# Patient Record
Sex: MALE | Race: WHITE | NOT HISPANIC OR LATINO | URBAN - METROPOLITAN AREA
[De-identification: names, ages, dates, MRNs, and addresses within clinical notes are randomized per-mention and may not be internally consistent; named-entity substitution may affect disease eponyms.]

---

## 2023-10-06 ENCOUNTER — LAB (OUTPATIENT)
Dept: LAB | Facility: LAB | Age: 78
End: 2023-10-06
Payer: MEDICARE

## 2023-10-06 ENCOUNTER — NURSING HOME VISIT (OUTPATIENT)
Dept: POST ACUTE CARE | Facility: EXTERNAL LOCATION | Age: 78
End: 2023-10-06

## 2023-10-06 DIAGNOSIS — E53.8 VITAMIN B12 DEFICIENCY: ICD-10-CM

## 2023-10-06 DIAGNOSIS — E11.9 TYPE 2 DIABETES MELLITUS WITHOUT COMPLICATION, WITHOUT LONG-TERM CURRENT USE OF INSULIN (MULTI): ICD-10-CM

## 2023-10-06 DIAGNOSIS — K21.9 GERD WITHOUT ESOPHAGITIS: ICD-10-CM

## 2023-10-06 DIAGNOSIS — I10 HYPERTENSION, BENIGN: ICD-10-CM

## 2023-10-06 DIAGNOSIS — E55.9 VITAMIN D DEFICIENCY: ICD-10-CM

## 2023-10-06 DIAGNOSIS — F41.1 GENERALIZED ANXIETY DISORDER: ICD-10-CM

## 2023-10-06 DIAGNOSIS — F33.1 MAJOR DEPRESSIVE DISORDER, RECURRENT, MODERATE (MULTI): ICD-10-CM

## 2023-10-06 DIAGNOSIS — U07.1 COVID-19: Primary | ICD-10-CM

## 2023-10-06 PROBLEM — K59.01 SLOW TRANSIT CONSTIPATION: Status: ACTIVE | Noted: 2023-10-06

## 2023-10-06 PROBLEM — K29.70 GASTRITIS DETERMINED BY ENDOSCOPY: Status: ACTIVE | Noted: 2023-10-06

## 2023-10-06 PROBLEM — G47.00 INSOMNIA: Status: ACTIVE | Noted: 2023-10-06

## 2023-10-06 PROBLEM — I63.9 CEREBROVASCULAR ACCIDENT (CVA) (MULTI): Status: ACTIVE | Noted: 2023-10-06

## 2023-10-06 PROBLEM — R73.09 BLOOD GLUCOSE ABNORMAL: Status: ACTIVE | Noted: 2023-10-06

## 2023-10-06 PROBLEM — R19.5 GUAIAC POSITIVE STOOLS: Status: ACTIVE | Noted: 2023-10-06

## 2023-10-06 PROBLEM — M10.9 GOUT: Status: ACTIVE | Noted: 2023-10-06

## 2023-10-06 PROBLEM — M19.90 ARTHRITIS: Status: ACTIVE | Noted: 2023-10-06

## 2023-10-06 PROBLEM — R26.9 ABNORMAL GAIT: Status: ACTIVE | Noted: 2023-10-06

## 2023-10-06 PROBLEM — N40.0 BENIGN PROSTATIC HYPERPLASIA WITHOUT URINARY OBSTRUCTION: Status: ACTIVE | Noted: 2023-10-06

## 2023-10-06 PROBLEM — I20.9 ANGINA PECTORIS (CMS-HCC): Status: ACTIVE | Noted: 2023-10-06

## 2023-10-06 PROBLEM — K80.20 CHOLELITHIASIS WITHOUT OBSTRUCTION: Status: ACTIVE | Noted: 2023-10-06

## 2023-10-06 PROBLEM — N39.0 ACUTE LOWER URINARY TRACT INFECTION: Status: ACTIVE | Noted: 2023-10-06

## 2023-10-06 PROBLEM — G56.00 CARPAL TUNNEL SYNDROME: Status: ACTIVE | Noted: 2023-10-06

## 2023-10-06 PROBLEM — G90.3 ORTHOSTATIC HYPOTENSION DUE TO PARKINSON'S DISEASE (MULTI): Status: ACTIVE | Noted: 2023-10-06

## 2023-10-06 PROBLEM — M19.019 INFLAMMATION OF JOINT OF SHOULDER REGION: Status: ACTIVE | Noted: 2023-10-06

## 2023-10-06 PROBLEM — S32.000A COMPRESSION FRACTURE OF LUMBAR VERTEBRA (MULTI): Status: ACTIVE | Noted: 2023-10-06

## 2023-10-06 PROBLEM — Z86.73 HISTORY OF CEREBROVASCULAR ACCIDENT: Status: ACTIVE | Noted: 2023-10-06

## 2023-10-06 PROBLEM — R10.13 DYSPEPSIA: Status: ACTIVE | Noted: 2023-10-06

## 2023-10-06 PROBLEM — I49.9 CARDIAC ARRHYTHMIA: Status: ACTIVE | Noted: 2023-10-06

## 2023-10-06 LAB
ALBUMIN SERPL BCP-MCNC: 3.7 G/DL (ref 3.4–5)
ALP SERPL-CCNC: 85 U/L (ref 33–136)
ALT SERPL W P-5'-P-CCNC: 8 U/L (ref 10–52)
ANION GAP SERPL CALC-SCNC: 17 MMOL/L (ref 10–20)
AST SERPL W P-5'-P-CCNC: 8 U/L (ref 9–39)
BILIRUB SERPL-MCNC: 0.3 MG/DL (ref 0–1.2)
BUN SERPL-MCNC: 21 MG/DL (ref 6–23)
CALCIUM SERPL-MCNC: 9.4 MG/DL (ref 8.6–10.3)
CHLORIDE SERPL-SCNC: 105 MMOL/L (ref 98–107)
CO2 SERPL-SCNC: 22 MMOL/L (ref 21–32)
CREAT SERPL-MCNC: 1 MG/DL (ref 0.5–1.3)
ERYTHROCYTE [DISTWIDTH] IN BLOOD BY AUTOMATED COUNT: 13.6 % (ref 11.5–14.5)
GFR SERPL CREATININE-BSD FRML MDRD: 77 ML/MIN/1.73M*2
GLUCOSE SERPL-MCNC: 124 MG/DL (ref 74–99)
HCT VFR BLD AUTO: 35.7 % (ref 41–52)
HGB BLD-MCNC: 11.5 G/DL (ref 13.5–17.5)
MCH RBC QN AUTO: 28.3 PG (ref 26–34)
MCHC RBC AUTO-ENTMCNC: 32.2 G/DL (ref 32–36)
MCV RBC AUTO: 88 FL (ref 80–100)
NRBC BLD-RTO: 0 /100 WBCS (ref 0–0)
PLATELET # BLD AUTO: 245 X10*3/UL (ref 150–450)
PMV BLD AUTO: 10.4 FL (ref 7.5–11.5)
POTASSIUM SERPL-SCNC: 5.2 MMOL/L (ref 3.5–5.3)
PROT SERPL-MCNC: 6 G/DL (ref 6.4–8.2)
RBC # BLD AUTO: 4.06 X10*6/UL (ref 4.5–5.9)
SODIUM SERPL-SCNC: 139 MMOL/L (ref 136–145)
WBC # BLD AUTO: 6.8 X10*3/UL (ref 4.4–11.3)

## 2023-10-06 PROCEDURE — 99349 HOME/RES VST EST MOD MDM 40: CPT | Performed by: NURSE PRACTITIONER

## 2023-10-06 PROCEDURE — 36415 COLL VENOUS BLD VENIPUNCTURE: CPT | Performed by: NURSE PRACTITIONER

## 2023-10-06 PROCEDURE — 82306 VITAMIN D 25 HYDROXY: CPT

## 2023-10-06 PROCEDURE — 82607 VITAMIN B-12: CPT

## 2023-10-06 PROCEDURE — 36415 COLL VENOUS BLD VENIPUNCTURE: CPT

## 2023-10-06 PROCEDURE — 83036 HEMOGLOBIN GLYCOSYLATED A1C: CPT

## 2023-10-06 ASSESSMENT — PAIN SCALES - GENERAL: PAINLEVEL: 0-NO PAIN

## 2023-10-06 NOTE — PROGRESS NOTES
"Subjective   Patient ID: Phuc Varela is a 77 y.o. male who presents for Follow-up (Covid, increased s/sx of depression).    Visit for 76 y/o male seen today at [facility], accompanied by facility staff for follow up of COVID, increased s/sx of depression and routine labs. Patient is lying on his bed this afternoon. He easily awakes upon verbal command and is able to answer all questions regarding his health. He was recently diagnosed with COVID. He reports that his cough and nasal drainage have improved but he is still eating poorly. His appetite varies and this started before his covid diagnosis. Facility staff reports that patient is attention seeking. He is not wanting to eat and will check his glucose level reporting hypoglycemia. He had a recent glucose level of 50 and when staff inquired if patient ate he stated \"no\". He tells me that he did not eat lunch today. He states \"I'm anxious, anxious about life in general\". He did see his mental health NP this week and she is aware of his current s/sx. He denies SI or HI. He is taking Mirtazepine and is compliant with his medications. He denies fever, chills, chest pain, shortness of breath, wheezing, abdominal pain, nausea, vomiting. Admits to intermittent diarrhea and states \"its because I'm anxious\". He denies bladder concerns. Denies difficulty sleeping. Due for routine labs today.         Current Outpatient Medications:     amLODIPine (Norvasc) 5 mg tablet, Take 1 tablet (5 mg) by mouth once daily., Disp: , Rfl:     desvenlafaxine 50 mg 24 hr tablet, Take 1 tablet (50 mg) by mouth once daily at bedtime., Disp: , Rfl:     docusate sodium (Colace) 100 mg capsule, Take 1 capsule (100 mg) by mouth once daily., Disp: , Rfl:     mirtazapine (Remeron) 7.5 mg tablet, Take 1 tablet (7.5 mg) by mouth once daily at bedtime., Disp: , Rfl:     acetaminophen (Tylenol) 325 mg tablet, Take 2 tablets (650 mg) by mouth every 6 hours if needed for mild pain (1 - 3) or " fever (temp greater than 38.0 C)., Disp: , Rfl:     allopurinol (Zyloprim) 300 mg tablet, Take 1 tablet (300 mg) by mouth once daily., Disp: , Rfl:     atorvastatin (Lipitor) 10 mg tablet, Take 1 tablet (10 mg) by mouth once daily., Disp: , Rfl:     blood sugar diagnostic strip, 1 strip by in vitro route once daily., Disp: , Rfl:     cholecalciferol (Vitamin D-3) 50 MCG (2000 UT) tablet, Take 1 tablet (2,000 Units) by mouth once daily., Disp: , Rfl:     clopidogrel (Plavix) 75 mg tablet, Take 1 tablet (75 mg) by mouth once daily., Disp: , Rfl:     cyanocobalamin (Vitamin B-12) 1,000 mcg tablet, Take 1 tablet (1,000 mcg) by mouth once daily., Disp: , Rfl:     glipiZIDE (Glucotrol) 5 mg tablet, Take 1.5 tablets (7.5 mg) by mouth 2 times a day., Disp: , Rfl:     hydrOXYzine pamoate (Vistaril) 25 mg capsule, Take 1 capsule (25 mg) by mouth 4 times a day as needed for anxiety., Disp: , Rfl:     lancets (Lancets,Ultra Thin) misc, Inject 1 Lancet under the skin once daily., Disp: , Rfl:     losartan (Cozaar) 100 mg tablet, Take 1 tablet (100 mg) by mouth once daily., Disp: , Rfl:     magnesium oxide (Mag-Ox) 400 mg (241.3 mg magnesium) tablet, Take 1 tablet (400 mg) by mouth once daily., Disp: , Rfl:     metFORMIN (Glucophage) 1,000 mg tablet, Take 1 tablet (1,000 mg) by mouth 2 times a day with meals., Disp: , Rfl:     metoprolol tartrate (Lopressor) 25 mg tablet, Take 1 tablet (25 mg) by mouth 2 times a day., Disp: , Rfl:     pantoprazole (ProtoNix) 40 mg EC tablet, Take 1 tablet (40 mg) by mouth once daily in the morning. Take before meals., Disp: , Rfl:     potassium chloride CR 20 mEq ER tablet, Take 1 tablet (20 mEq) by mouth once daily., Disp: , Rfl:     sennosides (Senokot) 8.6 mg tablet, Take 1 tablet (8.6 mg) by mouth as needed at bedtime for constipation., Disp: , Rfl:     tamsulosin (Flomax) 0.4 mg 24 hr capsule, Take 1 capsule (0.4 mg) by mouth once daily., Disp: , Rfl:      Review of Systems   Constitutional:  "        Positive for poor appetite   HENT:  Negative for congestion and rhinorrhea.    Respiratory:  Positive for chest tightness (occasional). Negative for cough, shortness of breath and wheezing.    Cardiovascular:  Negative for chest pain, palpitations and leg swelling.   Gastrointestinal:  Positive for diarrhea. Negative for abdominal pain, constipation, nausea and vomiting.   Endocrine:        Positive for diabetes   Genitourinary:  Negative for difficulty urinating.   Musculoskeletal:  Positive for arthralgias.   Neurological:  Negative for dizziness and light-headedness.   Psychiatric/Behavioral:          Positive for depression, anxiety      Objective   /62 (BP Location: Right arm, Patient Position: Lying, BP Cuff Size: Adult)   Pulse 70   Temp 36.5 °C (97.7 °F) (Temporal)   Resp 16   Ht 1.6 m (5' 3\")   Wt 52.6 kg (116 lb)   BMI 20.55 kg/m²     Physical Exam  Constitutional:       General: He is not in acute distress.     Appearance: Normal appearance.      Comments: Lying in bed   HENT:      Head: Normocephalic and atraumatic.      Nose: Nose normal.      Mouth/Throat:      Mouth: Mucous membranes are moist.      Pharynx: Oropharynx is clear.   Eyes:      Extraocular Movements: Extraocular movements intact.      Pupils: Pupils are equal, round, and reactive to light.   Cardiovascular:      Rate and Rhythm: Normal rate and regular rhythm.      Pulses: Normal pulses.   Pulmonary:      Effort: Pulmonary effort is normal. No respiratory distress.   Abdominal:      General: Bowel sounds are normal.      Palpations: Abdomen is soft.      Tenderness: There is no abdominal tenderness.   Musculoskeletal:         General: Normal range of motion.      Cervical back: Neck supple.   Skin:     General: Skin is warm and dry.   Neurological:      General: No focal deficit present.      Mental Status: He is alert and oriented to person, place, and time.   Psychiatric:      Comments: Anxious       Assessment/Plan "   Diagnoses and all orders for this visit:  COVID-19  Comments:  Resolved.  Hypertension, benign  Comments:  chronic, vitals stable  Orders:  -     CBC; Future  -     Comprehensive metabolic panel; Future  Type 2 diabetes mellitus without complication, without long-term current use of insulin (CMS/HCC)  Comments:  chronic, discussed importance of healthy diet habits in order to reduce chance of hypoglycemia.  Orders:  -     Hemoglobin A1c; Future  GERD without esophagitis  Comments:  chronic, continue pantoprazole  Vitamin D deficiency  Comments:  chronic, continue vitamin d supplement  Orders:  -     Vitamin D 25-Hydroxy,Total (for eval of Vitamin D levels); Future  Vitamin B12 deficiency  Comments:  chronic, continue b12 supplement  Orders:  -     Vitamin B12; Future  Generalized anxiety disorder  Comments:  chronic, continue vistaril as needed. Follow up with mental health NP as scheduled  Major depressive disorder, recurrent, moderate (CMS/HCC)  Comments:  chronic, continue Mirtazepine and Desvenlafaxine, follow up with Neeta White, mental health NP     Routine labs obtained in facility- CBC, CMP, A1C, Vitamin D, Vitamin B12 level- pt tolerated well.     Esmer Turpin, APRN-CNP

## 2023-10-07 LAB
25(OH)D3 SERPL-MCNC: 85 NG/ML (ref 30–100)
EST. AVERAGE GLUCOSE BLD GHB EST-MCNC: 154 MG/DL
HBA1C MFR BLD: 7 %
VIT B12 SERPL-MCNC: 947 PG/ML (ref 211–911)

## 2023-10-09 VITALS
TEMPERATURE: 97.7 F | RESPIRATION RATE: 16 BRPM | WEIGHT: 116 LBS | DIASTOLIC BLOOD PRESSURE: 62 MMHG | BODY MASS INDEX: 20.55 KG/M2 | HEART RATE: 70 BPM | SYSTOLIC BLOOD PRESSURE: 102 MMHG | HEIGHT: 63 IN

## 2023-10-09 RX ORDER — CLOPIDOGREL BISULFATE 75 MG/1
75 TABLET ORAL DAILY
COMMUNITY

## 2023-10-09 RX ORDER — GLIPIZIDE 5 MG/1
7.5 TABLET ORAL 2 TIMES DAILY
COMMUNITY

## 2023-10-09 RX ORDER — MIRTAZAPINE 7.5 MG/1
7.5 TABLET, FILM COATED ORAL NIGHTLY
COMMUNITY
Start: 2021-08-17

## 2023-10-09 RX ORDER — TAMSULOSIN HYDROCHLORIDE 0.4 MG/1
0.4 CAPSULE ORAL DAILY
COMMUNITY

## 2023-10-09 RX ORDER — PANTOPRAZOLE SODIUM 40 MG/1
40 TABLET, DELAYED RELEASE ORAL
COMMUNITY

## 2023-10-09 RX ORDER — ACETAMINOPHEN 325 MG/1
650 TABLET ORAL EVERY 6 HOURS PRN
COMMUNITY

## 2023-10-09 RX ORDER — AMLODIPINE BESYLATE 5 MG/1
5 TABLET ORAL DAILY
COMMUNITY
Start: 2022-06-07

## 2023-10-09 RX ORDER — LANCETS
1 EACH MISCELLANEOUS DAILY
COMMUNITY
End: 2024-05-29 | Stop reason: SDUPTHER

## 2023-10-09 RX ORDER — DESVENLAFAXINE 50 MG/1
50 TABLET, EXTENDED RELEASE ORAL NIGHTLY
COMMUNITY
Start: 2022-06-14

## 2023-10-09 RX ORDER — ALLOPURINOL 300 MG/1
300 TABLET ORAL DAILY
COMMUNITY

## 2023-10-09 RX ORDER — METFORMIN HYDROCHLORIDE 1000 MG/1
1000 TABLET ORAL
COMMUNITY

## 2023-10-09 RX ORDER — LANOLIN ALCOHOL/MO/W.PET/CERES
1000 CREAM (GRAM) TOPICAL DAILY
COMMUNITY

## 2023-10-09 RX ORDER — DOCUSATE SODIUM 100 MG/1
1 CAPSULE, LIQUID FILLED ORAL DAILY
COMMUNITY
Start: 2021-12-13

## 2023-10-09 RX ORDER — SENNOSIDES 8.6 MG/1
1 TABLET ORAL NIGHTLY PRN
COMMUNITY

## 2023-10-09 RX ORDER — METOPROLOL TARTRATE 25 MG/1
25 TABLET, FILM COATED ORAL 2 TIMES DAILY
COMMUNITY

## 2023-10-09 RX ORDER — LANOLIN ALCOHOL/MO/W.PET/CERES
400 CREAM (GRAM) TOPICAL DAILY
COMMUNITY

## 2023-10-09 RX ORDER — HYDROXYZINE PAMOATE 25 MG/1
25 CAPSULE ORAL 4 TIMES DAILY PRN
COMMUNITY

## 2023-10-09 RX ORDER — CHOLECALCIFEROL (VITAMIN D3) 50 MCG
2000 TABLET ORAL DAILY
COMMUNITY

## 2023-10-09 RX ORDER — ATORVASTATIN CALCIUM 10 MG/1
10 TABLET, FILM COATED ORAL DAILY
COMMUNITY

## 2023-10-09 RX ORDER — LOSARTAN POTASSIUM 100 MG/1
100 TABLET ORAL DAILY
COMMUNITY

## 2023-10-09 RX ORDER — POTASSIUM CHLORIDE 20 MEQ/1
20 TABLET, EXTENDED RELEASE ORAL DAILY
COMMUNITY

## 2023-10-10 ASSESSMENT — ENCOUNTER SYMPTOMS
DIARRHEA: 1
NAUSEA: 0
WHEEZING: 0
VOMITING: 0
LIGHT-HEADEDNESS: 0
RHINORRHEA: 0
SHORTNESS OF BREATH: 0
ARTHRALGIAS: 1
ABDOMINAL PAIN: 0
ENDOCRINE COMMENTS: POSITIVE FOR DIABETES
CHEST TIGHTNESS: 1
DIZZINESS: 0
COUGH: 0
DIFFICULTY URINATING: 0
PALPITATIONS: 0
CONSTIPATION: 0

## 2023-10-31 ENCOUNTER — TELEPHONE (OUTPATIENT)
Dept: PRIMARY CARE | Facility: CLINIC | Age: 78
End: 2023-10-31
Payer: MEDICARE

## 2023-10-31 NOTE — TELEPHONE ENCOUNTER
Amalia calls today regarding the patient having three teeth pulled on Nov 2, 2023.  Patient Dentist requesting patient stop blood thinner Plavix for 11/1, 11/2, and 11/3.      Additionally, Amalia would like provider to know that in the past this patient was advised not to stop the Plavix, if that is the case, the dentist would just need to know so proper precautions can be taken.    Please Advise

## 2023-11-03 ENCOUNTER — TELEPHONE (OUTPATIENT)
Dept: POST ACUTE CARE | Facility: EXTERNAL LOCATION | Age: 78
End: 2023-11-03

## 2023-11-03 ENCOUNTER — NURSING HOME VISIT (OUTPATIENT)
Dept: POST ACUTE CARE | Facility: EXTERNAL LOCATION | Age: 78
End: 2023-11-03
Payer: MEDICARE

## 2023-11-03 VITALS
TEMPERATURE: 97.3 F | SYSTOLIC BLOOD PRESSURE: 118 MMHG | HEART RATE: 78 BPM | HEIGHT: 63 IN | DIASTOLIC BLOOD PRESSURE: 64 MMHG | RESPIRATION RATE: 16 BRPM | BODY MASS INDEX: 21.26 KG/M2 | WEIGHT: 120 LBS | OXYGEN SATURATION: 96 %

## 2023-11-03 DIAGNOSIS — F33.1 MAJOR DEPRESSIVE DISORDER, RECURRENT, MODERATE (MULTI): ICD-10-CM

## 2023-11-03 DIAGNOSIS — I10 HTN (HYPERTENSION), BENIGN: ICD-10-CM

## 2023-11-03 DIAGNOSIS — F41.1 GENERALIZED ANXIETY DISORDER: ICD-10-CM

## 2023-11-03 DIAGNOSIS — I49.9 IRREGULAR HEART BEAT: Primary | ICD-10-CM

## 2023-11-03 DIAGNOSIS — Z98.890 HISTORY OF RECENT DENTAL PROCEDURE: ICD-10-CM

## 2023-11-03 PROBLEM — N20.0 KIDNEY STONES: Status: ACTIVE | Noted: 2023-09-07

## 2023-11-03 PROBLEM — F32.9 MAJOR DEPRESSIVE DISORDER WITH CURRENT ACTIVE EPISODE: Status: ACTIVE | Noted: 2023-09-07

## 2023-11-03 PROBLEM — F40.10 SOCIAL ANXIETY DISORDER: Status: ACTIVE | Noted: 2023-09-11

## 2023-11-03 PROBLEM — E11.9 TYPE II DIABETES MELLITUS (MULTI): Chronic | Status: ACTIVE | Noted: 2023-09-07

## 2023-11-03 PROCEDURE — 99349 HOME/RES VST EST MOD MDM 40: CPT | Performed by: NURSE PRACTITIONER

## 2023-11-03 RX ORDER — AMOXICILLIN 500 MG/1
1 CAPSULE ORAL EVERY 8 HOURS SCHEDULED
COMMUNITY
Start: 2023-11-02 | End: 2023-12-15 | Stop reason: ALTCHOICE

## 2023-11-03 ASSESSMENT — ENCOUNTER SYMPTOMS
OCCASIONAL FEELINGS OF UNSTEADINESS: 0
LOSS OF SENSATION IN FEET: 0
DEPRESSION: 1

## 2023-11-03 ASSESSMENT — PAIN SCALES - GENERAL: PAINLEVEL: 2

## 2023-11-03 NOTE — PROGRESS NOTES
"Subjective   Patient ID: Phuc Varela is a 77 y.o. male who presents for Irregular Heart Beat (Follow up dental extraction, report of irregular HR).    Visit for 76 y/o male seen today at [facility] for evaluation post dental extraction, report of abnormal heart rhythm. Patient just finished his breakfast upon provider arrival. He was able to ambulate to his room in the facility without difficulty. He is sitting in recliner with legs dependent now. Alert, oriented, able to answer all questions regarding his health. Patient reports that he had 3 teeth removed yesterday at the [facility] in [place] with Dr. Giron. He had his vitals taken and reports that he was told his heart rate was irregular and fluctuating between 40 and 80. Patient reports concern because he has never been told he has an abnormal heart rhythm. He has history of CVA and is on Plavix daily. His plavix was held for 3 days prior to his procedure. He is bring treated with Amoxicillan. Denies chest pain, heart palpitations, shortness of breath, cough or wheezing. Denies fever, chills. Reports that he removed the gauze from the dentist yesterday and has not had any further bleeding. The pain is improving and he is taking tylenol as needed. He has anxiety, depression. Reports that he will have increased anxiety at times requiring the use of his PRN Hydroxyzine. He states \"I worry about everything\". Remains active with [facility].          Current Outpatient Medications:     acetaminophen (Tylenol) 325 mg tablet, Take 2 tablets (650 mg) by mouth every 6 hours if needed for mild pain (1 - 3) or fever (temp greater than 38.0 C)., Disp: , Rfl:     allopurinol (Zyloprim) 300 mg tablet, Take 1 tablet (300 mg) by mouth once daily., Disp: , Rfl:     amLODIPine (Norvasc) 5 mg tablet, Take 1 tablet (5 mg) by mouth once daily., Disp: , Rfl:     atorvastatin (Lipitor) 10 mg tablet, Take 1 tablet (10 mg) by mouth once daily., " Disp: , Rfl:     blood sugar diagnostic strip, 1 strip by in vitro route once daily., Disp: , Rfl:     cholecalciferol (Vitamin D-3) 50 MCG (2000 UT) tablet, Take 1 tablet (2,000 Units) by mouth once daily., Disp: , Rfl:     clopidogrel (Plavix) 75 mg tablet, Take 1 tablet (75 mg) by mouth once daily., Disp: , Rfl:     cyanocobalamin (Vitamin B-12) 1,000 mcg tablet, Take 1 tablet (1,000 mcg) by mouth once daily., Disp: , Rfl:     desvenlafaxine 50 mg 24 hr tablet, Take 1 tablet (50 mg) by mouth once daily at bedtime., Disp: , Rfl:     docusate sodium (Colace) 100 mg capsule, Take 1 capsule (100 mg) by mouth once daily., Disp: , Rfl:     glipiZIDE (Glucotrol) 5 mg tablet, Take 1.5 tablets (7.5 mg) by mouth 2 times a day., Disp: , Rfl:     hydrOXYzine pamoate (Vistaril) 25 mg capsule, Take 1 capsule (25 mg) by mouth 4 times a day as needed for anxiety., Disp: , Rfl:     lancets (Lancets,Ultra Thin) misc, Inject 1 Lancet under the skin once daily., Disp: , Rfl:     losartan (Cozaar) 100 mg tablet, Take 1 tablet (100 mg) by mouth once daily., Disp: , Rfl:     magnesium oxide (Mag-Ox) 400 mg (241.3 mg magnesium) tablet, Take 1 tablet (400 mg) by mouth once daily., Disp: , Rfl:     metFORMIN (Glucophage) 1,000 mg tablet, Take 1 tablet (1,000 mg) by mouth 2 times a day with meals., Disp: , Rfl:     metoprolol tartrate (Lopressor) 25 mg tablet, Take 1 tablet (25 mg) by mouth 2 times a day., Disp: , Rfl:     mirtazapine (Remeron) 7.5 mg tablet, Take 1 tablet (7.5 mg) by mouth once daily at bedtime., Disp: , Rfl:     pantoprazole (ProtoNix) 40 mg EC tablet, Take 1 tablet (40 mg) by mouth once daily in the morning. Take before meals., Disp: , Rfl:     potassium chloride CR 20 mEq ER tablet, Take 1 tablet (20 mEq) by mouth once daily., Disp: , Rfl:     sennosides (Senokot) 8.6 mg tablet, Take 1 tablet (8.6 mg) by mouth as needed at bedtime for constipation., Disp: , Rfl:     tamsulosin (Flomax) 0.4 mg 24 hr capsule, Take 1  "capsule (0.4 mg) by mouth once daily., Disp: , Rfl:      Review of Systems  Constitutional:  Negative for fever, chills, unexplained weight loss  HENT:  Positive for mouth discomfort. Negative for congestion and rhinorrhea.   Respiratory:  Negative for cough, shortness of breath and wheezing.    Cardiovascular:  Negative for chest pain, chest tightness, palpitations and leg swelling.   Gastrointestinal: Negative for abdominal pain, constipation, diarrhea, nausea and vomiting.   Endocrine: Positive for diabetes  Genitourinary:  Negative for difficulty urinating.   Musculoskeletal:  Positive for generalized arthritic pains   Neurological:  Negative for dizziness and light-headedness.   Psychiatric/Behavioral: Positive for depression, anxiety    Objective   /64 (BP Location: Right arm, Patient Position: Sitting, BP Cuff Size: Adult)   Pulse 78   Temp 36.3 °C (97.3 °F) (Temporal)   Resp 16   Ht 1.6 m (5' 3\")   Wt 54.4 kg (120 lb)   SpO2 96%   BMI 21.26 kg/m²     Physical Exam  Constitutional:       General: Sitting in chair with legs dependent. He is not in acute distress.     Appearance: Normal appearance. Appears calm. Poor eye contact.   HENT:      Head: Normocephalic and atraumatic.      Nose: Nose normal.      Mouth/Throat: Dental extraction to right upper, left upper, left lower, no active bleeding. No jaw swelling.      Mouth: Mucous membranes are moist.      Pharynx: Oropharynx is clear.   Eyes:      Extraocular Movements: Extraocular movements intact.      Pupils: Pupils are equal, round, and reactive to light.   Cardiovascular:      Rate and Rhythm: Normal rate and regular rhythm with occasional PACs     Pulses: Normal pulses.   Pulmonary:      Effort: Pulmonary effort is normal. No respiratory distress.   Abdominal:      General: Bowel sounds are normal.      Palpations: Abdomen is soft.      Tenderness: There is no abdominal tenderness.   Musculoskeletal:         General: Normal range of motion. "      Cervical back: Neck supple.   Skin:     General: Skin is warm and dry.   Neurological:      General: No focal deficit present.      Mental Status: He is alert and oriented to person, place, and time.   Psychiatric:      Comments: anxious when discussing health    Assessment/Plan   Diagnoses and all orders for this visit:  Irregular heart beat  Comments:  HR stable, occasional PACs noted. Will get EKG. Continue Metoprolol  Orders:  -     ECG 12 lead ([facility] Performed); Future  HTN (hypertension), benign  Comments:  chronic, vitals stable, continue amlodipine and Losartan  History of recent dental procedure  Comments:  s/p dental extraction of 3 teeth yesterday. Continue tylenol for pain/discomfort. May restart Plavix as ordered  Major depressive disorder, recurrent, moderate (CMS/HCC)  Comments:  chronic, continue Remeron, Desvenlafaxine  Generalized anxiety disorder  Comments:  chronic, frequently anxious, continue hydroxyzine. Continue to follow with mental health team from [facility]       Esmer Turpin, APRN-CNP

## 2023-11-03 NOTE — TELEPHONE ENCOUNTER
I ordered an EKG for patient for irregular heart beat. Can you schedule through [facility].

## 2023-11-06 ENCOUNTER — TELEPHONE (OUTPATIENT)
Dept: PRIMARY CARE | Facility: CLINIC | Age: 78
End: 2023-11-06
Payer: MEDICARE

## 2023-11-06 NOTE — TELEPHONE ENCOUNTER
12 Lead EKG results:   Normal sinus rhythm  RBBB    IMPRESSION:   ABNORMAL EKG     Results scanned to patient chart

## 2023-11-06 NOTE — TELEPHONE ENCOUNTER
Amalia, caregiver at [facility] made aware of EKG results as per provider has written.

## 2023-12-08 ENCOUNTER — TELEPHONE (OUTPATIENT)
Dept: PRIMARY CARE | Facility: CLINIC | Age: 78
End: 2023-12-08
Payer: MEDICARE

## 2023-12-08 NOTE — TELEPHONE ENCOUNTER
Pt had fall out of bed last night. Per Amalia he did not notify staff and c/o bump to head last night. This morning he has small red area and has refused ER eval.

## 2023-12-15 ENCOUNTER — NURSING HOME VISIT (OUTPATIENT)
Dept: POST ACUTE CARE | Facility: EXTERNAL LOCATION | Age: 78
End: 2023-12-15
Payer: MEDICARE

## 2023-12-15 VITALS
SYSTOLIC BLOOD PRESSURE: 126 MMHG | RESPIRATION RATE: 16 BRPM | HEART RATE: 74 BPM | DIASTOLIC BLOOD PRESSURE: 70 MMHG | TEMPERATURE: 97.3 F | WEIGHT: 120 LBS | BODY MASS INDEX: 21.26 KG/M2

## 2023-12-15 DIAGNOSIS — Z86.73 HISTORY OF CEREBROVASCULAR ACCIDENT: ICD-10-CM
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
## 2023-12-15 DIAGNOSIS — G47.00 INSOMNIA, UNSPECIFIED TYPE: ICD-10-CM

## 2023-12-15 DIAGNOSIS — S09.90XA CLOSED HEAD INJURY, INITIAL ENCOUNTER: Primary | ICD-10-CM

## 2023-12-15 PROCEDURE — 99349 HOME/RES VST EST MOD MDM 40: CPT | Performed by: NURSE PRACTITIONER

## 2023-12-15 ASSESSMENT — PAIN SCALES - GENERAL: PAINLEVEL: 4

## 2023-12-15 NOTE — PROGRESS NOTES
Subjective   Patient ID: Phuc Varela is a 78 y.o. male who presents for Fall (Status post fall, rolled out of bed).    Visit for 77 y/o male seen today at [facility] for evaluation post mechanical fall. Facility staff reports that patient notified staff on 12/8 that he rolled out of his bed during the night and hit his head. He did not notify any staff at the time of the fall. He was found to have a small hematoma but declined to go to the emergency room for evaluation. He is sitting in his room this morning on his chair. He is alert, oriented, able to answer all questions regarding his health. He reports that he was having a vivid dream where he was having a pillow fight with someone and he went to swing the pillow and ended up on the floor. He does admit to hitting his head but denies headaches, dizziness, blurry vision, nausea or vomiting post fall. He does not believe that he lost consciousness. He is currently on Plavix. No other blood thinners noted. He admits to arthritic pains, chronically, but denies any pain or discomfort post fall. He has anxiety, depression and insomnia. Continues to see mental health NP with [facility] as well as the counselor. He has told his psych team about the vivid dreams. He continues on Desvenlafaxine, Hydroxyzine and Mirtazepine. Tolerates current regimen well. Denies any SI or HI today.         Current Outpatient Medications:     acetaminophen (Tylenol) 325 mg tablet, Take 2 tablets (650 mg) by mouth every 6 hours if needed for mild pain (1 - 3) or fever (temp greater than 38.0 C)., Disp: , Rfl:     allopurinol (Zyloprim) 300 mg tablet, Take 1 tablet (300 mg) by mouth once daily., Disp: , Rfl:     amLODIPine (Norvasc) 5 mg tablet, Take 1 tablet (5 mg) by mouth once daily., Disp: , Rfl:     atorvastatin (Lipitor) 10 mg tablet, Take 1 tablet (10 mg) by mouth once daily., Disp: , Rfl:     blood sugar diagnostic strip, 1 strip by in vitro route once daily., Disp: , Rfl:      cholecalciferol (Vitamin D-3) 50 MCG (2000 UT) tablet, Take 1 tablet (2,000 Units) by mouth once daily., Disp: , Rfl:     clopidogrel (Plavix) 75 mg tablet, Take 1 tablet (75 mg) by mouth once daily., Disp: , Rfl:     cyanocobalamin (Vitamin B-12) 1,000 mcg tablet, Take 1 tablet (1,000 mcg) by mouth once daily., Disp: , Rfl:     desvenlafaxine 50 mg 24 hr tablet, Take 1 tablet (50 mg) by mouth once daily at bedtime., Disp: , Rfl:     docusate sodium (Colace) 100 mg capsule, Take 1 capsule (100 mg) by mouth once daily., Disp: , Rfl:     glipiZIDE (Glucotrol) 5 mg tablet, Take 1.5 tablets (7.5 mg) by mouth 2 times a day., Disp: , Rfl:     hydrOXYzine pamoate (Vistaril) 25 mg capsule, Take 1 capsule (25 mg) by mouth 4 times a day as needed for anxiety., Disp: , Rfl:     lancets (Lancets,Ultra Thin) misc, Inject 1 Lancet under the skin once daily., Disp: , Rfl:     losartan (Cozaar) 100 mg tablet, Take 1 tablet (100 mg) by mouth once daily., Disp: , Rfl:     magnesium oxide (Mag-Ox) 400 mg (241.3 mg magnesium) tablet, Take 1 tablet (400 mg) by mouth once daily., Disp: , Rfl:     metFORMIN (Glucophage) 1,000 mg tablet, Take 1 tablet (1,000 mg) by mouth 2 times a day with meals., Disp: , Rfl:     metoprolol tartrate (Lopressor) 25 mg tablet, Take 1 tablet (25 mg) by mouth 2 times a day., Disp: , Rfl:     mirtazapine (Remeron) 7.5 mg tablet, Take 1 tablet (7.5 mg) by mouth once daily at bedtime., Disp: , Rfl:     pantoprazole (ProtoNix) 40 mg EC tablet, Take 1 tablet (40 mg) by mouth once daily in the morning. Take before meals., Disp: , Rfl:     potassium chloride CR 20 mEq ER tablet, Take 1 tablet (20 mEq) by mouth once daily., Disp: , Rfl:     sennosides (Senokot) 8.6 mg tablet, Take 1 tablet (8.6 mg) by mouth as needed at bedtime for constipation., Disp: , Rfl:     tamsulosin (Flomax) 0.4 mg 24 hr capsule, Take 1 capsule (0.4 mg) by mouth once daily., Disp: , Rfl:      Review of Systems  Constitutional:  Negative  for fever, chills, unexplained weight loss  HENT:  Negative for difficulty swallowing, hearing loss.   Respiratory:  Negative for cough, shortness of breath and wheezing.    Cardiovascular:  Negative for chest pain, chest tightness, palpitations and leg swelling.   Gastrointestinal: Negative for abdominal pain, constipation, diarrhea, nausea and vomiting.   Endocrine: Positive for diabetes  Genitourinary:  Negative for difficulty urinating.   Musculoskeletal:  Positive for generalized arthritic pains, recent fall with head injury  Neurological:  Negative for headaches, dizziness and light-headedness.   Psychiatric/Behavioral: Positive for depression, anxiety, insomnia    Objective   /70 (BP Location: Left arm, Patient Position: Sitting, BP Cuff Size: Adult)   Pulse 74   Temp 36.3 °C (97.3 °F) (Temporal)   Resp 16   Wt 54.4 kg (120 lb)   BMI 21.26 kg/m²     Physical Exam  Constitutional:       General: Sitting in chair with legs dependent. He is not in acute distress.     Appearance: Normal appearance. Appears calm. Poor eye contact.   HENT:      Head: Normocephalic and atraumatic. No hematoma on exam     Nose: Nose normal.      Mouth: Mucous membranes are moist.      Pharynx: Oropharynx is clear.   Eyes:      Extraocular Movements: Extraocular movements intact.      Pupils: Pupils are equal, round, and reactive to light.   Cardiovascular:      Rate and Rhythm: Normal rate and regular rhythm      Pulses: Normal pulses.   Pulmonary:      Effort: Pulmonary effort is normal. No respiratory distress.   Abdominal:      General: Bowel sounds are normal.      Palpations: Abdomen is soft.      Tenderness: There is no abdominal tenderness.   Musculoskeletal:         General: Normal range of motion.      Cervical back: Neck supple.      No spinal tenderness  Skin:     General: Skin is warm and dry. No ecchymosis  Neurological:      General: No focal deficit present.      Mental Status: He is alert and oriented to  person, place, and time.   Psychiatric:      Comments: anxious when discussing health    Assessment/Plan   Diagnoses and all orders for this visit:  Closed head injury, initial encounter  Comments:  acute, s/p fall on 12/8. Initially had small hematoma. Has since resolved. Mentation at baseline  Fall, initial encounter  Comments:  acute, s/p fall. Pt rolled out of bed. Encouraged pt to keep his bed in lowest position. He declines.  History of cerebrovascular accident  Comments:  stable, continue Plavix. Monitor for increased s/sx of bruising and or bleeding  Insomnia, unspecified type  Comments:  chronic, continues on Mirtazepine. Reports vivid dreams. Currently seeing mental health provider and counseling through [facility]       HEMANT Dumont-CNP

## 2024-05-03 ENCOUNTER — NURSING HOME VISIT (OUTPATIENT)
Dept: POST ACUTE CARE | Facility: EXTERNAL LOCATION | Age: 79
End: 2024-05-03
Payer: MEDICARE

## 2024-05-03 VITALS
OXYGEN SATURATION: 95 % | RESPIRATION RATE: 16 BRPM | DIASTOLIC BLOOD PRESSURE: 76 MMHG | SYSTOLIC BLOOD PRESSURE: 122 MMHG | TEMPERATURE: 97.6 F | HEART RATE: 75 BPM

## 2024-05-03 DIAGNOSIS — I20.9 ANGINA PECTORIS (CMS-HCC): ICD-10-CM

## 2024-05-03 DIAGNOSIS — I10 HTN (HYPERTENSION), BENIGN: Primary | ICD-10-CM

## 2024-05-03 DIAGNOSIS — F41.1 GAD (GENERALIZED ANXIETY DISORDER): ICD-10-CM

## 2024-05-03 DIAGNOSIS — K21.9 GERD WITHOUT ESOPHAGITIS: ICD-10-CM

## 2024-05-03 DIAGNOSIS — F33.1 MAJOR DEPRESSIVE DISORDER, RECURRENT, MODERATE (MULTI): ICD-10-CM

## 2024-05-03 DIAGNOSIS — Z86.73 HISTORY OF CEREBROVASCULAR ACCIDENT: ICD-10-CM

## 2024-05-03 DIAGNOSIS — E11.9 TYPE 2 DIABETES MELLITUS WITHOUT COMPLICATION, WITHOUT LONG-TERM CURRENT USE OF INSULIN (MULTI): ICD-10-CM

## 2024-05-03 LAB
ALBUMIN SERPL BCP-MCNC: 3.8 G/DL (ref 3.4–5)
ALP SERPL-CCNC: 116 U/L (ref 33–136)
ALT SERPL W P-5'-P-CCNC: 10 U/L (ref 10–52)
ANION GAP SERPL CALC-SCNC: 13 MMOL/L (ref 10–20)
AST SERPL W P-5'-P-CCNC: 10 U/L (ref 9–39)
BILIRUB SERPL-MCNC: 0.4 MG/DL (ref 0–1.2)
BUN SERPL-MCNC: 27 MG/DL (ref 6–23)
CALCIUM SERPL-MCNC: 9.5 MG/DL (ref 8.6–10.3)
CHLORIDE SERPL-SCNC: 103 MMOL/L (ref 98–107)
CHOLEST SERPL-MCNC: 83 MG/DL (ref 0–199)
CHOLESTEROL/HDL RATIO: 2.8
CO2 SERPL-SCNC: 26 MMOL/L (ref 21–32)
CREAT SERPL-MCNC: 1.23 MG/DL (ref 0.5–1.3)
EGFRCR SERPLBLD CKD-EPI 2021: 60 ML/MIN/1.73M*2
ERYTHROCYTE [DISTWIDTH] IN BLOOD BY AUTOMATED COUNT: 13.9 % (ref 11.5–14.5)
GLUCOSE SERPL-MCNC: 240 MG/DL (ref 74–99)
HCT VFR BLD AUTO: 35.8 % (ref 41–52)
HDLC SERPL-MCNC: 30.1 MG/DL
HGB BLD-MCNC: 11.8 G/DL (ref 13.5–17.5)
LDLC SERPL CALC-MCNC: 14 MG/DL
MCH RBC QN AUTO: 27.9 PG (ref 26–34)
MCHC RBC AUTO-ENTMCNC: 33 G/DL (ref 32–36)
MCV RBC AUTO: 85 FL (ref 80–100)
NON HDL CHOLESTEROL: 53 MG/DL (ref 0–149)
NRBC BLD-RTO: 0 /100 WBCS (ref 0–0)
PLATELET # BLD AUTO: 245 X10*3/UL (ref 150–450)
POTASSIUM SERPL-SCNC: 4.3 MMOL/L (ref 3.5–5.3)
PROT SERPL-MCNC: 6 G/DL (ref 6.4–8.2)
RBC # BLD AUTO: 4.23 X10*6/UL (ref 4.5–5.9)
SODIUM SERPL-SCNC: 138 MMOL/L (ref 136–145)
TRIGL SERPL-MCNC: 195 MG/DL (ref 0–149)
VLDL: 39 MG/DL (ref 0–40)
WBC # BLD AUTO: 9.4 X10*3/UL (ref 4.4–11.3)

## 2024-05-03 PROCEDURE — 99349 HOME/RES VST EST MOD MDM 40: CPT | Performed by: NURSE PRACTITIONER

## 2024-05-03 PROCEDURE — 36415 COLL VENOUS BLD VENIPUNCTURE: CPT | Performed by: NURSE PRACTITIONER

## 2024-05-03 PROCEDURE — 36415 COLL VENOUS BLD VENIPUNCTURE: CPT

## 2024-05-03 PROCEDURE — 83036 HEMOGLOBIN GLYCOSYLATED A1C: CPT

## 2024-05-03 ASSESSMENT — PAIN SCALES - GENERAL: PAINLEVEL: 2

## 2024-05-03 NOTE — LETTER
"Patient: Phuc Varela  : 1945    Encounter Date: 2024    Subjective  Patient ID: Phuc Varela is a 78 y.o. male who presents for Follow-up (Routine follow up, labs ).    Visit for 79 y/o male seen today at [facility] for routine follow up of chronic medical conditions. PMHx of HTN, angina, cardiac arrhythmia, CVA, DM, GERD, Arthritis, Constipation, Gout, Vitamin D Deficiency, Anxiety, Depression, Insomnia. Patient is sitting on chair in room this afternoon watching videos on his cell phone. He is alert, oriented, able to answer all questions regarding his health. Patient is able to do his own dressing and toileting but requires assistance with showering and meal preparation. He denies any appetite changes or signs of weight loss. Denies abdominal pain, nausea, vomiting. He has history of constipation but denies any current bowel concerns. Denies urinary concerns. He has history of CVA, managed with Plavix. He denies any increased bruising or bleeding concerns. Denies chest pain, palpitations, shortness of breath, cough or wheezing. He is ambulatory without assistive device. Denies recent fall or injury. He reports that he is feeling well over. He does endorse occasional \"vivid dreams\" and he is followed closely by his mental health provider through [facility]. He is also established with the counselor who comes to the assisted living facility. He reports that most of his dreams are about his past or when he was working and he will occasionally wake up \"sweating\". He feels bored most of the time but denies SI/HI. He has DM and monitors his glucose levels once daily. His FBS was 72 this morning. He denies any BG values lower than this. Denies dizziness or lightheadedness. He reports that most values are between 100 and 115. He has osteoarthritis, mostly of the hands and will occasionally endorse pain/stiffness to his fingers. He is due for routine labs today.          Current Outpatient " Medications:   •  acetaminophen (Tylenol) 325 mg tablet, Take 2 tablets (650 mg) by mouth every 6 hours if needed for mild pain (1 - 3) or fever (temp greater than 38.0 C)., Disp: , Rfl:   •  allopurinol (Zyloprim) 300 mg tablet, Take 1 tablet (300 mg) by mouth once daily., Disp: , Rfl:   •  amLODIPine (Norvasc) 5 mg tablet, Take 1 tablet (5 mg) by mouth once daily., Disp: , Rfl:   •  atorvastatin (Lipitor) 10 mg tablet, Take 1 tablet (10 mg) by mouth once daily., Disp: , Rfl:   •  blood sugar diagnostic strip, 1 strip by in vitro route once daily., Disp: , Rfl:   •  cholecalciferol (Vitamin D-3) 50 MCG (2000 UT) tablet, Take 1 tablet (2,000 Units) by mouth once daily., Disp: , Rfl:   •  clopidogrel (Plavix) 75 mg tablet, Take 1 tablet (75 mg) by mouth once daily., Disp: , Rfl:   •  cyanocobalamin (Vitamin B-12) 1,000 mcg tablet, Take 1 tablet (1,000 mcg) by mouth once daily., Disp: , Rfl:   •  desvenlafaxine 50 mg 24 hr tablet, Take 1 tablet (50 mg) by mouth once daily at bedtime., Disp: , Rfl:   •  docusate sodium (Colace) 100 mg capsule, Take 1 capsule (100 mg) by mouth once daily., Disp: , Rfl:   •  glipiZIDE (Glucotrol) 5 mg tablet, Take 1.5 tablets (7.5 mg) by mouth 2 times a day., Disp: , Rfl:   •  hydrOXYzine pamoate (Vistaril) 25 mg capsule, Take 1 capsule (25 mg) by mouth 4 times a day as needed for anxiety., Disp: , Rfl:   •  lancets (Lancets,Ultra Thin) misc, Inject 1 Lancet under the skin once daily., Disp: , Rfl:   •  losartan (Cozaar) 100 mg tablet, Take 1 tablet (100 mg) by mouth once daily., Disp: , Rfl:   •  magnesium oxide (Mag-Ox) 400 mg (241.3 mg magnesium) tablet, Take 1 tablet (400 mg) by mouth once daily., Disp: , Rfl:   •  metFORMIN (Glucophage) 1,000 mg tablet, Take 1 tablet (1,000 mg) by mouth 2 times a day with meals., Disp: , Rfl:   •  metoprolol tartrate (Lopressor) 25 mg tablet, Take 1 tablet (25 mg) by mouth 2 times a day., Disp: , Rfl:   •  mirtazapine (Remeron) 7.5 mg tablet, Take 1  tablet (7.5 mg) by mouth once daily at bedtime., Disp: , Rfl:   •  pantoprazole (ProtoNix) 40 mg EC tablet, Take 1 tablet (40 mg) by mouth once daily in the morning. Take before meals., Disp: , Rfl:   •  potassium chloride CR 20 mEq ER tablet, Take 1 tablet (20 mEq) by mouth once daily., Disp: , Rfl:   •  sennosides (Senokot) 8.6 mg tablet, Take 1 tablet (8.6 mg) by mouth as needed at bedtime for constipation., Disp: , Rfl:   •  tamsulosin (Flomax) 0.4 mg 24 hr capsule, Take 1 capsule (0.4 mg) by mouth once daily., Disp: , Rfl:      Review of Systems  Constitutional:  Negative for fever, chills, unexplained weight loss  HENT:  Negative for difficulty swallowing, hearing loss.   Respiratory:  Negative for cough, shortness of breath and wheezing.    Cardiovascular:  Negative for chest pain, chest tightness, palpitations and leg swelling.   Gastrointestinal: Negative for abdominal pain, constipation, diarrhea, nausea and vomiting.   Endocrine: Positive for diabetes  Genitourinary:  Negative for difficulty urinating.   Musculoskeletal:  Positive for generalized arthritic pains  Neurological:  Negative for headaches, dizziness and light-headedness.   Psychiatric/Behavioral: Positive for depression, anxiety, insomnia    Objective  /76 (BP Location: Left arm, Patient Position: Sitting, BP Cuff Size: Adult)   Pulse 75   Temp 36.4 °C (97.6 °F) (Temporal)   Resp 16   SpO2 95%     Physical Exam  Constitutional:       General: Sitting in chair with legs dependent. He is not in acute distress.     Appearance: Alert. Normal appearance.   HENT:      Head: Normocephalic and atraumatic. No hematoma on exam     Nose: Nose normal.      Mouth: Mucous membranes are moist.      Pharynx: Oropharynx is clear.   Eyes:      Extraocular Movements: Extraocular movements intact.      Pupils: Pupils are equal, round, and reactive to light.   Cardiovascular:      Rate and Rhythm: Normal rate and regular rhythm      Pulses: Normal  pulses.   Pulmonary:      Effort: Pulmonary effort is normal. No respiratory distress.   Abdominal:      General: Bowel sounds are normal.      Palpations: Abdomen is soft.      Tenderness: There is no abdominal tenderness.   Musculoskeletal:         General: Normal range of motion.      Cervical back: Neck supple.      No spinal tenderness  Skin:     General: Skin is warm and dry. No ecchymosis  Neurological:      General: No focal deficit present.      Mental Status: He is alert and oriented to person, place, and time.   Psychiatric:      Comments: calm and cooperative on exam.     Assessment/Plan  Diagnoses and all orders for this visit:  HTN (hypertension), benign  -     CBC; Future  -     Comprehensive metabolic panel; Future  -chronic, vitals stable  -continue Amlodipine, Losartan, Metoprolol     Angina pectoris (CMS-HCC)  -     Lipid panel; Future  -chronic, no reports of chest pain  -continue atorvastatin    History of cerebrovascular accident  -chronic, managed with Plavix     Type 2 diabetes mellitus without complication, without long-term current use of insulin (Multi)  -     Hemoglobin A1c; Future  -chronic, managed with Glipizide, Metformin     GERD without esophagitis  -chronic, stable  -no current GI concerns   -continue pantoprazole     Major depressive disorder, recurrent, moderate (Multi)  -chronic, mood stable  -continue to follow with [facility]  -continue Desvenlafaxine, Mirtazepine     BILLY (generalized anxiety disorder)  -chronic, managed with Hydroxyzine as needed  -continue to follow with counselor from [email or web address]     Routine labs obtained- CBC, CMP, Lipid panel, A1c level- pt tolerated well        HEMANT Dumont-AUGUSTUS       Electronically Signed By: JACINTO Dumont   5/3/24 12:44 PM

## 2024-05-03 NOTE — PROGRESS NOTES
"Subjective   Patient ID: Phuc Varela is a 78 y.o. male who presents for Follow-up (Routine follow up, labs ).    Visit for 77 y/o male seen today at [facility] for routine follow up of chronic medical conditions. PMHx of HTN, angina, cardiac arrhythmia, CVA, DM, GERD, Arthritis, Constipation, Gout, Vitamin D Deficiency, Anxiety, Depression, Insomnia. Patient is sitting on chair in room this afternoon watching videos on his cell phone. He is alert, oriented, able to answer all questions regarding his health. Patient is able to do his own dressing and toileting but requires assistance with showering and meal preparation. He denies any appetite changes or signs of weight loss. Denies abdominal pain, nausea, vomiting. He has history of constipation but denies any current bowel concerns. Denies urinary concerns. He has history of CVA, managed with Plavix. He denies any increased bruising or bleeding concerns. Denies chest pain, palpitations, shortness of breath, cough or wheezing. He is ambulatory without assistive device. Denies recent fall or injury. He reports that he is feeling well over. He does endorse occasional \"vivid dreams\" and he is followed closely by his mental health provider through [facility]. He is also established with the counselor who comes to the assisted living facility. He reports that most of his dreams are about his past or when he was working and he will occasionally wake up \"sweating\". He feels bored most of the time but denies SI/HI. He has DM and monitors his glucose levels once daily. His FBS was 72 this morning. He denies any BG values lower than this. Denies dizziness or lightheadedness. He reports that most values are between 100 and 115. He has osteoarthritis, mostly of the hands and will occasionally endorse pain/stiffness to his fingers. He is due for routine labs today.          Current Outpatient Medications:     acetaminophen (Tylenol) 325 mg tablet, Take 2 tablets (650 " mg) by mouth every 6 hours if needed for mild pain (1 - 3) or fever (temp greater than 38.0 C)., Disp: , Rfl:     allopurinol (Zyloprim) 300 mg tablet, Take 1 tablet (300 mg) by mouth once daily., Disp: , Rfl:     amLODIPine (Norvasc) 5 mg tablet, Take 1 tablet (5 mg) by mouth once daily., Disp: , Rfl:     atorvastatin (Lipitor) 10 mg tablet, Take 1 tablet (10 mg) by mouth once daily., Disp: , Rfl:     blood sugar diagnostic strip, 1 strip by in vitro route once daily., Disp: , Rfl:     cholecalciferol (Vitamin D-3) 50 MCG (2000 UT) tablet, Take 1 tablet (2,000 Units) by mouth once daily., Disp: , Rfl:     clopidogrel (Plavix) 75 mg tablet, Take 1 tablet (75 mg) by mouth once daily., Disp: , Rfl:     cyanocobalamin (Vitamin B-12) 1,000 mcg tablet, Take 1 tablet (1,000 mcg) by mouth once daily., Disp: , Rfl:     desvenlafaxine 50 mg 24 hr tablet, Take 1 tablet (50 mg) by mouth once daily at bedtime., Disp: , Rfl:     docusate sodium (Colace) 100 mg capsule, Take 1 capsule (100 mg) by mouth once daily., Disp: , Rfl:     glipiZIDE (Glucotrol) 5 mg tablet, Take 1.5 tablets (7.5 mg) by mouth 2 times a day., Disp: , Rfl:     hydrOXYzine pamoate (Vistaril) 25 mg capsule, Take 1 capsule (25 mg) by mouth 4 times a day as needed for anxiety., Disp: , Rfl:     lancets (Lancets,Ultra Thin) misc, Inject 1 Lancet under the skin once daily., Disp: , Rfl:     losartan (Cozaar) 100 mg tablet, Take 1 tablet (100 mg) by mouth once daily., Disp: , Rfl:     magnesium oxide (Mag-Ox) 400 mg (241.3 mg magnesium) tablet, Take 1 tablet (400 mg) by mouth once daily., Disp: , Rfl:     metFORMIN (Glucophage) 1,000 mg tablet, Take 1 tablet (1,000 mg) by mouth 2 times a day with meals., Disp: , Rfl:     metoprolol tartrate (Lopressor) 25 mg tablet, Take 1 tablet (25 mg) by mouth 2 times a day., Disp: , Rfl:     mirtazapine (Remeron) 7.5 mg tablet, Take 1 tablet (7.5 mg) by mouth once daily at bedtime., Disp: , Rfl:     pantoprazole (ProtoNix) 40  mg EC tablet, Take 1 tablet (40 mg) by mouth once daily in the morning. Take before meals., Disp: , Rfl:     potassium chloride CR 20 mEq ER tablet, Take 1 tablet (20 mEq) by mouth once daily., Disp: , Rfl:     sennosides (Senokot) 8.6 mg tablet, Take 1 tablet (8.6 mg) by mouth as needed at bedtime for constipation., Disp: , Rfl:     tamsulosin (Flomax) 0.4 mg 24 hr capsule, Take 1 capsule (0.4 mg) by mouth once daily., Disp: , Rfl:      Review of Systems  Constitutional:  Negative for fever, chills, unexplained weight loss  HENT:  Negative for difficulty swallowing, hearing loss.   Respiratory:  Negative for cough, shortness of breath and wheezing.    Cardiovascular:  Negative for chest pain, chest tightness, palpitations and leg swelling.   Gastrointestinal: Negative for abdominal pain, constipation, diarrhea, nausea and vomiting.   Endocrine: Positive for diabetes  Genitourinary:  Negative for difficulty urinating.   Musculoskeletal:  Positive for generalized arthritic pains  Neurological:  Negative for headaches, dizziness and light-headedness.   Psychiatric/Behavioral: Positive for depression, anxiety, insomnia    Objective   /76 (BP Location: Left arm, Patient Position: Sitting, BP Cuff Size: Adult)   Pulse 75   Temp 36.4 °C (97.6 °F) (Temporal)   Resp 16   SpO2 95%     Physical Exam  Constitutional:       General: Sitting in chair with legs dependent. He is not in acute distress.     Appearance: Alert. Normal appearance.   HENT:      Head: Normocephalic and atraumatic. No hematoma on exam     Nose: Nose normal.      Mouth: Mucous membranes are moist.      Pharynx: Oropharynx is clear.   Eyes:      Extraocular Movements: Extraocular movements intact.      Pupils: Pupils are equal, round, and reactive to light.   Cardiovascular:      Rate and Rhythm: Normal rate and regular rhythm      Pulses: Normal pulses.   Pulmonary:      Effort: Pulmonary effort is normal. No respiratory distress.   Abdominal:       General: Bowel sounds are normal.      Palpations: Abdomen is soft.      Tenderness: There is no abdominal tenderness.   Musculoskeletal:         General: Normal range of motion.      Cervical back: Neck supple.      No spinal tenderness  Skin:     General: Skin is warm and dry. No ecchymosis  Neurological:      General: No focal deficit present.      Mental Status: He is alert and oriented to person, place, and time.   Psychiatric:      Comments: calm and cooperative on exam.     Assessment/Plan   Diagnoses and all orders for this visit:  HTN (hypertension), benign  -     CBC; Future  -     Comprehensive metabolic panel; Future  -chronic, vitals stable  -continue Amlodipine, Losartan, Metoprolol     Angina pectoris (CMS-[facility])  -     Lipid panel; Future  -chronic, no reports of chest pain  -continue atorvastatin    History of cerebrovascular accident  -chronic, managed with Plavix     Type 2 diabetes mellitus without complication, without long-term current use of insulin (Multi)  -     Hemoglobin A1c; Future  -chronic, managed with Glipizide, Metformin     GERD without esophagitis  -chronic, stable  -no current GI concerns   -continue pantoprazole     Major depressive disorder, recurrent, moderate (Multi)  -chronic, mood stable  -continue to follow with [facility]  -continue Desvenlafaxine, Mirtazepine     BILLY (generalized anxiety disorder)  -chronic, managed with Hydroxyzine as needed  -continue to follow with counselor from [facility]     Routine labs obtained- CBC, CMP, Lipid panel, A1c level- pt tolerated well        HEMANT Dumont-CNP

## 2024-05-04 LAB
EST. AVERAGE GLUCOSE BLD GHB EST-MCNC: 192 MG/DL
HBA1C MFR BLD: 8.3 %

## 2024-05-29 DIAGNOSIS — E11.9 TYPE 2 DIABETES MELLITUS WITHOUT COMPLICATION, WITHOUT LONG-TERM CURRENT USE OF INSULIN (MULTI): Primary | ICD-10-CM

## 2024-05-29 RX ORDER — LANCETS
1 EACH MISCELLANEOUS DAILY
Qty: 100 EACH | Refills: 3 | Status: SHIPPED | OUTPATIENT
Start: 2024-05-29

## 2024-07-26 ENCOUNTER — NURSING HOME VISIT (OUTPATIENT)
Dept: POST ACUTE CARE | Facility: EXTERNAL LOCATION | Age: 79
End: 2024-07-26
Payer: MEDICARE

## 2024-07-26 VITALS
TEMPERATURE: 97.7 F | WEIGHT: 130 LBS | SYSTOLIC BLOOD PRESSURE: 134 MMHG | DIASTOLIC BLOOD PRESSURE: 74 MMHG | OXYGEN SATURATION: 95 % | BODY MASS INDEX: 23.04 KG/M2 | HEART RATE: 89 BPM | RESPIRATION RATE: 16 BRPM | HEIGHT: 63 IN

## 2024-07-26 DIAGNOSIS — E11.9 TYPE 2 DIABETES MELLITUS WITHOUT COMPLICATION, WITHOUT LONG-TERM CURRENT USE OF INSULIN (MULTI): ICD-10-CM

## 2024-07-26 DIAGNOSIS — H61.23 BILATERAL IMPACTED CERUMEN: Primary | ICD-10-CM

## 2024-07-26 DIAGNOSIS — E78.1 HIGH TRIGLYCERIDES: ICD-10-CM

## 2024-07-26 DIAGNOSIS — I10 HTN (HYPERTENSION), BENIGN: ICD-10-CM

## 2024-07-26 DIAGNOSIS — Z86.73 HISTORY OF CEREBROVASCULAR ACCIDENT: ICD-10-CM

## 2024-07-26 PROCEDURE — 69210 REMOVE IMPACTED EAR WAX UNI: CPT | Performed by: NURSE PRACTITIONER

## 2024-07-26 PROCEDURE — 99349 HOME/RES VST EST MOD MDM 40: CPT | Performed by: NURSE PRACTITIONER

## 2024-07-26 ASSESSMENT — PAIN SCALES - GENERAL: PAINLEVEL: 3

## 2024-07-26 NOTE — PROGRESS NOTES
"Subjective   Patient ID: Phuc Varela is a 78 y.o. male who presents for Follow-up (Routine follow up, hearing loss).    Visit for 79 y/o male seen today at [facility] for routine follow up, concerns of difficulty hearing. PMHx of HTN, angina, cardiac arrhythmia, CVA, DM, GERD, Arthritis, Constipation, Gout, Vitamin D Deficiency, Anxiety, Depression, Insomnia. Pt is sitting on chair in room this morning. He is alert, oriented, able to answer all questions regarding his health. Pt reports that he is having increased difficulty hearing and that everything sounds \"muffled\". Pt has history of cerumen impaction and has been using debrox drops in ears for the past week. He reports that his right ear \"feels wet\" and he has been sticking tissues in it and getting small pieces of wax out. Pt denies any fever, chills, ear pain, sore throat, jaw pain. He denies appetite changes, weight loss, abdominal pain, nausea, vomiting. He has history of constipation but denies any current bowel concerns. Denies urinary concerns. Pt has history of CVA. He denies chest pain, palpitations, shortness of breath, cough or wheezing. He is ambulatory without assistive device. Denies recent fall or injury. Pt has DM. He monitors his BG levels daily. FBS was 66 this morning. Pt was asymptomatic with this. He reports a glucose level of 220 last week after eating crackers and jelly. Last A1c was 8.3, elevated from previous value of 7.0. Pt declined any medication adjustments and requested to work on changing diet habits. Pt has osteoarthritis. He endorses chronic pain and stiffness to his hands/fingers. Pt is working with therapy services in the facility through [facility] and finds this to be beneficial.         Current Outpatient Medications:     acetaminophen (Tylenol) 325 mg tablet, Take 2 tablets (650 mg) by mouth every 6 hours if needed for mild pain (1 - 3) or fever (temp greater than 38.0 C)., Disp: , Rfl:     allopurinol (Zyloprim) " 300 mg tablet, Take 1 tablet (300 mg) by mouth once daily., Disp: , Rfl:     amLODIPine (Norvasc) 5 mg tablet, Take 1 tablet (5 mg) by mouth once daily., Disp: , Rfl:     atorvastatin (Lipitor) 10 mg tablet, Take 1 tablet (10 mg) by mouth once daily., Disp: , Rfl:     blood sugar diagnostic strip, 1 strip by in vitro route once daily., Disp: , Rfl:     cholecalciferol (Vitamin D-3) 50 MCG (2000 UT) tablet, Take 1 tablet (2,000 Units) by mouth once daily., Disp: , Rfl:     clopidogrel (Plavix) 75 mg tablet, Take 1 tablet (75 mg) by mouth once daily., Disp: , Rfl:     cyanocobalamin (Vitamin B-12) 1,000 mcg tablet, Take 1 tablet (1,000 mcg) by mouth once daily., Disp: , Rfl:     desvenlafaxine 50 mg 24 hr tablet, Take 1 tablet (50 mg) by mouth once daily at bedtime., Disp: , Rfl:     docusate sodium (Colace) 100 mg capsule, Take 1 capsule (100 mg) by mouth once daily., Disp: , Rfl:     glipiZIDE (Glucotrol) 5 mg tablet, Take 1.5 tablets (7.5 mg) by mouth 2 times a day., Disp: , Rfl:     hydrOXYzine pamoate (Vistaril) 25 mg capsule, Take 1 capsule (25 mg) by mouth 4 times a day as needed for anxiety., Disp: , Rfl:     lancets (Lancets,Ultra Thin) misc, Inject 1 Lancet under the skin once daily., Disp: 100 each, Rfl: 3    losartan (Cozaar) 100 mg tablet, Take 1 tablet (100 mg) by mouth once daily., Disp: , Rfl:     magnesium oxide (Mag-Ox) 400 mg (241.3 mg magnesium) tablet, Take 1 tablet (400 mg) by mouth once daily., Disp: , Rfl:     metFORMIN (Glucophage) 1,000 mg tablet, Take 1 tablet (1,000 mg) by mouth 2 times a day with meals., Disp: , Rfl:     metoprolol tartrate (Lopressor) 25 mg tablet, Take 1 tablet (25 mg) by mouth 2 times a day., Disp: , Rfl:     mirtazapine (Remeron) 7.5 mg tablet, Take 1 tablet (7.5 mg) by mouth once daily at bedtime., Disp: , Rfl:     pantoprazole (ProtoNix) 40 mg EC tablet, Take 1 tablet (40 mg) by mouth once daily in the morning. Take before meals., Disp: , Rfl:     potassium chloride  "CR 20 mEq ER tablet, Take 1 tablet (20 mEq) by mouth once daily., Disp: , Rfl:     sennosides (Senokot) 8.6 mg tablet, Take 1 tablet (8.6 mg) by mouth as needed at bedtime for constipation., Disp: , Rfl:     tamsulosin (Flomax) 0.4 mg 24 hr capsule, Take 1 capsule (0.4 mg) by mouth once daily., Disp: , Rfl:      Review of Systems  Constitutional: Negative for fever, chills, unexplained weight loss  HENT: Positive for hearing loss. Negative for difficulty swallowing.   Respiratory: Negative for cough, shortness of breath and wheezing.    Cardiovascular: Negative for chest pain, chest tightness, palpitations and leg swelling.   Gastrointestinal: Negative for abdominal pain, constipation, diarrhea, nausea and vomiting.   Endocrine: Positive for diabetes  Genitourinary: Negative for difficulty urinating.   Musculoskeletal: Positive for generalized arthritic pains, mostly of the hands   Neurological: Negative for headaches, dizziness and light-headedness.   Psychiatric/Behavioral: Positive for depression, anxiety, insomnia     Objective   /74 (BP Location: Right arm, Patient Position: Sitting, BP Cuff Size: Adult)   Pulse 89   Temp 36.5 °C (97.7 °F) (Temporal)   Resp 16   Ht 1.6 m (5' 3\")   Wt 59 kg (130 lb)   SpO2 95%   BMI 23.03 kg/m²     Physical Exam  Constitutional:       General: Sitting in chair with legs dependent. He is not in acute distress.     Appearance: Alert. Normal appearance.   HENT:      Head: Normocephalic and atraumatic.      Nose: Nose normal.      Mouth: Mucous membranes are moist.      Pharynx: Oropharynx is clear.      HENT:      Right Ear: decreased hearing noted. There is impacted cerumen.      Left Ear: decreased hearing noted. There is impacted cerumen.  Eyes:      Extraocular Movements: Extraocular movements intact.      Pupils: Pupils are equal, round, and reactive to light.   Cardiovascular:      Rate and Rhythm: Normal rate and regular rhythm      Pulses: Normal pulses. " "  Pulmonary:      Effort: Pulmonary effort is normal. No respiratory distress.   Abdominal:      General: Bowel sounds are normal.      Palpations: Abdomen is soft.      Tenderness: There is no abdominal tenderness.   Musculoskeletal:         General: Normal range of motion.      Cervical back: Neck supple.      No spinal tenderness  Skin:     General: Skin is warm and dry. No ecchymosis  Neurological:      General: No focal deficit present.      Mental Status: He is alert and oriented to person, place, and time.   Psychiatric:      Comments: calm and cooperative on exam.     Lab Results   Component Value Date    WBC 9.4 05/03/2024    HGB 11.8 (L) 05/03/2024    HCT 35.8 (L) 05/03/2024    MCV 85 05/03/2024     05/03/2024       Chemistry    Lab Results   Component Value Date/Time     05/03/2024 1210    K 4.3 05/03/2024 1210     05/03/2024 1210    CO2 26 05/03/2024 1210    BUN 27 (H) 05/03/2024 1210    CREATININE 1.23 05/03/2024 1210    Lab Results   Component Value Date/Time    CALCIUM 9.5 05/03/2024 1210    ALKPHOS 116 05/03/2024 1210    AST 10 05/03/2024 1210    ALT 10 05/03/2024 1210    BILITOT 0.4 05/03/2024 1210        Lab Results   Component Value Date    CHOL 83 05/03/2024    CHOL 83 (L) 03/24/2023    CHOL 76 06/06/2022     Lab Results   Component Value Date    HDL 30.1 05/03/2024    HDL 39 (L) 03/24/2023    HDL 41.0 06/06/2022     Lab Results   Component Value Date    LDLCALC 14 05/03/2024    LDLCALC 25 (L) 03/24/2023    LDLCALC 25 (L) 11/20/2020     Lab Results   Component Value Date    TRIG 195 (H) 05/03/2024    TRIG 94 03/24/2023    TRIG 81 06/06/2022     No components found for: \"CHOLHDL\"     Lab Results   Component Value Date    HGBA1C 8.3 (H) 05/03/2024      Assessment/Plan   Diagnoses and all orders for this visit:  Bilateral impacted cerumen  -pt used debrox gtts for past week  -bilateral ears flushed with large amount of cerumen removed, remaining cerumen removed via " instrumentation. Tms visualized post removal.     HTN (hypertension), benign  -chronic, vitals stable  -continue amlodipine, losartan     High triglycerides  -last lipid panel in May with elevated triglycerides  -pt declined medication adjustment  -he is working on healthy diet changes     Type 2 diabetes mellitus without complication, without long-term current use of insulin (Multi)  -chronic, poorly controlled   -continue metformin, glipizide     History of cerebrovascular accident  -chronic, managed with Plavix  -continue safety measures and supportive care       Esmer Turpin, APRN-CNP

## 2024-07-26 NOTE — LETTER
"Patient: Phuc Varela  : 1945    Encounter Date: 2024    Subjective   Patient ID: Phuc Varela is a 78 y.o. male who presents for Follow-up (Routine follow up, hearing loss).    Visit for 79 y/o male seen today at [facility] for routine follow up, concerns of difficulty hearing. PMHx of HTN, angina, cardiac arrhythmia, CVA, DM, GERD, Arthritis, Constipation, Gout, Vitamin D Deficiency, Anxiety, Depression, Insomnia. Pt is sitting on chair in room this morning. He is alert, oriented, able to answer all questions regarding his health. Pt reports that he is having increased difficulty hearing and that everything sounds \"muffled\". Pt has history of cerumen impaction and has been using debrox drops in ears for the past week. He reports that his right ear \"feels wet\" and he has been sticking tissues in it and getting small pieces of wax out. Pt denies any fever, chills, ear pain, sore throat, jaw pain. He denies appetite changes, weight loss, abdominal pain, nausea, vomiting. He has history of constipation but denies any current bowel concerns. Denies urinary concerns. Pt has history of CVA. He denies chest pain, palpitations, shortness of breath, cough or wheezing. He is ambulatory without assistive device. Denies recent fall or injury. Pt has DM. He monitors his BG levels daily. FBS was 66 this morning. Pt was asymptomatic with this. He reports a glucose level of 220 last week after eating crackers and jelly. Last A1c was 8.3, elevated from previous value of 7.0. Pt declined any medication adjustments and requested to work on changing diet habits. Pt has osteoarthritis. He endorses chronic pain and stiffness to his hands/fingers. Pt is working with therapy services in the facility through [facility] and finds this to be beneficial.         Current Outpatient Medications:   •  acetaminophen (Tylenol) 325 mg tablet, Take 2 tablets (650 mg) by mouth every 6 hours if needed for mild pain (1 - 3) or fever " (temp greater than 38.0 C)., Disp: , Rfl:   •  allopurinol (Zyloprim) 300 mg tablet, Take 1 tablet (300 mg) by mouth once daily., Disp: , Rfl:   •  amLODIPine (Norvasc) 5 mg tablet, Take 1 tablet (5 mg) by mouth once daily., Disp: , Rfl:   •  atorvastatin (Lipitor) 10 mg tablet, Take 1 tablet (10 mg) by mouth once daily., Disp: , Rfl:   •  blood sugar diagnostic strip, 1 strip by in vitro route once daily., Disp: , Rfl:   •  cholecalciferol (Vitamin D-3) 50 MCG (2000 UT) tablet, Take 1 tablet (2,000 Units) by mouth once daily., Disp: , Rfl:   •  clopidogrel (Plavix) 75 mg tablet, Take 1 tablet (75 mg) by mouth once daily., Disp: , Rfl:   •  cyanocobalamin (Vitamin B-12) 1,000 mcg tablet, Take 1 tablet (1,000 mcg) by mouth once daily., Disp: , Rfl:   •  desvenlafaxine 50 mg 24 hr tablet, Take 1 tablet (50 mg) by mouth once daily at bedtime., Disp: , Rfl:   •  docusate sodium (Colace) 100 mg capsule, Take 1 capsule (100 mg) by mouth once daily., Disp: , Rfl:   •  glipiZIDE (Glucotrol) 5 mg tablet, Take 1.5 tablets (7.5 mg) by mouth 2 times a day., Disp: , Rfl:   •  hydrOXYzine pamoate (Vistaril) 25 mg capsule, Take 1 capsule (25 mg) by mouth 4 times a day as needed for anxiety., Disp: , Rfl:   •  lancets (Lancets,Ultra Thin) misc, Inject 1 Lancet under the skin once daily., Disp: 100 each, Rfl: 3  •  losartan (Cozaar) 100 mg tablet, Take 1 tablet (100 mg) by mouth once daily., Disp: , Rfl:   •  magnesium oxide (Mag-Ox) 400 mg (241.3 mg magnesium) tablet, Take 1 tablet (400 mg) by mouth once daily., Disp: , Rfl:   •  metFORMIN (Glucophage) 1,000 mg tablet, Take 1 tablet (1,000 mg) by mouth 2 times a day with meals., Disp: , Rfl:   •  metoprolol tartrate (Lopressor) 25 mg tablet, Take 1 tablet (25 mg) by mouth 2 times a day., Disp: , Rfl:   •  mirtazapine (Remeron) 7.5 mg tablet, Take 1 tablet (7.5 mg) by mouth once daily at bedtime., Disp: , Rfl:   •  pantoprazole (ProtoNix) 40 mg EC tablet, Take 1 tablet (40 mg) by  "mouth once daily in the morning. Take before meals., Disp: , Rfl:   •  potassium chloride CR 20 mEq ER tablet, Take 1 tablet (20 mEq) by mouth once daily., Disp: , Rfl:   •  sennosides (Senokot) 8.6 mg tablet, Take 1 tablet (8.6 mg) by mouth as needed at bedtime for constipation., Disp: , Rfl:   •  tamsulosin (Flomax) 0.4 mg 24 hr capsule, Take 1 capsule (0.4 mg) by mouth once daily., Disp: , Rfl:      Review of Systems  Constitutional: Negative for fever, chills, unexplained weight loss  HENT: Positive for hearing loss. Negative for difficulty swallowing.   Respiratory: Negative for cough, shortness of breath and wheezing.    Cardiovascular: Negative for chest pain, chest tightness, palpitations and leg swelling.   Gastrointestinal: Negative for abdominal pain, constipation, diarrhea, nausea and vomiting.   Endocrine: Positive for diabetes  Genitourinary: Negative for difficulty urinating.   Musculoskeletal: Positive for generalized arthritic pains, mostly of the hands   Neurological: Negative for headaches, dizziness and light-headedness.   Psychiatric/Behavioral: Positive for depression, anxiety, insomnia     Objective   /74 (BP Location: Right arm, Patient Position: Sitting, BP Cuff Size: Adult)   Pulse 89   Temp 36.5 °C (97.7 °F) (Temporal)   Resp 16   Ht 1.6 m (5' 3\")   Wt 59 kg (130 lb)   SpO2 95%   BMI 23.03 kg/m²     Physical Exam  Constitutional:       General: Sitting in chair with legs dependent. He is not in acute distress.     Appearance: Alert. Normal appearance.   HENT:      Head: Normocephalic and atraumatic.      Nose: Nose normal.      Mouth: Mucous membranes are moist.      Pharynx: Oropharynx is clear.      HENT:      Right Ear: decreased hearing noted. There is impacted cerumen.      Left Ear: decreased hearing noted. There is impacted cerumen.  Eyes:      Extraocular Movements: Extraocular movements intact.      Pupils: Pupils are equal, round, and reactive to light. " "  Cardiovascular:      Rate and Rhythm: Normal rate and regular rhythm      Pulses: Normal pulses.   Pulmonary:      Effort: Pulmonary effort is normal. No respiratory distress.   Abdominal:      General: Bowel sounds are normal.      Palpations: Abdomen is soft.      Tenderness: There is no abdominal tenderness.   Musculoskeletal:         General: Normal range of motion.      Cervical back: Neck supple.      No spinal tenderness  Skin:     General: Skin is warm and dry. No ecchymosis  Neurological:      General: No focal deficit present.      Mental Status: He is alert and oriented to person, place, and time.   Psychiatric:      Comments: calm and cooperative on exam.     Lab Results   Component Value Date    WBC 9.4 05/03/2024    HGB 11.8 (L) 05/03/2024    HCT 35.8 (L) 05/03/2024    MCV 85 05/03/2024     05/03/2024       Chemistry    Lab Results   Component Value Date/Time     05/03/2024 1210    K 4.3 05/03/2024 1210     05/03/2024 1210    CO2 26 05/03/2024 1210    BUN 27 (H) 05/03/2024 1210    CREATININE 1.23 05/03/2024 1210    Lab Results   Component Value Date/Time    CALCIUM 9.5 05/03/2024 1210    ALKPHOS 116 05/03/2024 1210    AST 10 05/03/2024 1210    ALT 10 05/03/2024 1210    BILITOT 0.4 05/03/2024 1210        Lab Results   Component Value Date    CHOL 83 05/03/2024    CHOL 83 (L) 03/24/2023    CHOL 76 06/06/2022     Lab Results   Component Value Date    HDL 30.1 05/03/2024    HDL 39 (L) 03/24/2023    HDL 41.0 06/06/2022     Lab Results   Component Value Date    LDLCALC 14 05/03/2024    LDLCALC 25 (L) 03/24/2023    LDLCALC 25 (L) 11/20/2020     Lab Results   Component Value Date    TRIG 195 (H) 05/03/2024    TRIG 94 03/24/2023    TRIG 81 06/06/2022     No components found for: \"CHOLHDL\"     Lab Results   Component Value Date    HGBA1C 8.3 (H) 05/03/2024      Assessment/Plan   Diagnoses and all orders for this visit:  Bilateral impacted cerumen  -pt used debrox gtts for past " week  -bilateral ears flushed with large amount of cerumen removed, remaining cerumen removed via instrumentation. Tms visualized post removal.     HTN (hypertension), benign  -chronic, vitals stable  -continue amlodipine, losartan     High triglycerides  -last lipid panel in May with elevated triglycerides  -pt declined medication adjustment  -he is working on healthy diet changes     Type 2 diabetes mellitus without complication, without long-term current use of insulin (Multi)  -chronic, poorly controlled   -continue metformin, glipizide     History of cerebrovascular accident  -chronic, managed with Plavix  -continue safety measures and supportive care       JACINTO Dumont       Electronically Signed By: JACINTO Dumont   7/26/24 10:34 AM

## 2024-09-06 DIAGNOSIS — E11.9 TYPE 2 DIABETES MELLITUS WITHOUT COMPLICATION, WITHOUT LONG-TERM CURRENT USE OF INSULIN (MULTI): Primary | ICD-10-CM

## 2024-09-06 RX ORDER — LANCETS
1 EACH MISCELLANEOUS DAILY
Qty: 100 EACH | Refills: 3 | Status: SHIPPED | OUTPATIENT
Start: 2024-09-06

## 2024-10-18 ENCOUNTER — NURSING HOME VISIT (OUTPATIENT)
Dept: POST ACUTE CARE | Facility: EXTERNAL LOCATION | Age: 79
End: 2024-10-18

## 2024-10-18 ENCOUNTER — LAB (OUTPATIENT)
Dept: LAB | Facility: LAB | Age: 79
End: 2024-10-18
Payer: MEDICARE

## 2024-10-18 VITALS
WEIGHT: 135 LBS | DIASTOLIC BLOOD PRESSURE: 84 MMHG | TEMPERATURE: 97.7 F | HEART RATE: 75 BPM | SYSTOLIC BLOOD PRESSURE: 142 MMHG | RESPIRATION RATE: 18 BRPM | BODY MASS INDEX: 23.92 KG/M2 | HEIGHT: 63 IN | OXYGEN SATURATION: 98 %

## 2024-10-18 DIAGNOSIS — I10 HTN (HYPERTENSION), BENIGN: Primary | ICD-10-CM

## 2024-10-18 DIAGNOSIS — E55.9 VITAMIN D DEFICIENCY: ICD-10-CM

## 2024-10-18 DIAGNOSIS — E11.9 TYPE 2 DIABETES MELLITUS WITHOUT COMPLICATION, WITHOUT LONG-TERM CURRENT USE OF INSULIN (MULTI): ICD-10-CM

## 2024-10-18 DIAGNOSIS — I10 HTN (HYPERTENSION), BENIGN: ICD-10-CM

## 2024-10-18 DIAGNOSIS — E53.8 VITAMIN B12 DEFICIENCY: ICD-10-CM

## 2024-10-18 DIAGNOSIS — R41.82 ALTERED MENTAL STATUS, UNSPECIFIED ALTERED MENTAL STATUS TYPE: ICD-10-CM

## 2024-10-18 DIAGNOSIS — F41.1 GENERALIZED ANXIETY DISORDER: ICD-10-CM

## 2024-10-18 DIAGNOSIS — F33.1 MAJOR DEPRESSIVE DISORDER, RECURRENT, MODERATE: ICD-10-CM

## 2024-10-18 LAB
ALBUMIN SERPL BCP-MCNC: 3.8 G/DL (ref 3.4–5)
ALP SERPL-CCNC: 110 U/L (ref 33–136)
ALT SERPL W P-5'-P-CCNC: 10 U/L (ref 10–52)
ANION GAP SERPL CALC-SCNC: 15 MMOL/L (ref 10–20)
APPEARANCE UR: ABNORMAL
AST SERPL W P-5'-P-CCNC: 10 U/L (ref 9–39)
BASOPHILS # BLD AUTO: 0.05 X10*3/UL (ref 0–0.1)
BASOPHILS NFR BLD AUTO: 0.7 %
BILIRUB SERPL-MCNC: 0.4 MG/DL (ref 0–1.2)
BILIRUB UR STRIP.AUTO-MCNC: NEGATIVE MG/DL
BUN SERPL-MCNC: 20 MG/DL (ref 6–23)
CALCIUM SERPL-MCNC: 9.4 MG/DL (ref 8.6–10.3)
CHLORIDE SERPL-SCNC: 104 MMOL/L (ref 98–107)
CO2 SERPL-SCNC: 25 MMOL/L (ref 21–32)
COLOR UR: ABNORMAL
CREAT SERPL-MCNC: 1.11 MG/DL (ref 0.5–1.3)
EGFRCR SERPLBLD CKD-EPI 2021: 68 ML/MIN/1.73M*2
EOSINOPHIL # BLD AUTO: 0.22 X10*3/UL (ref 0–0.4)
EOSINOPHIL NFR BLD AUTO: 3 %
ERYTHROCYTE [DISTWIDTH] IN BLOOD BY AUTOMATED COUNT: 13.5 % (ref 11.5–14.5)
GLUCOSE SERPL-MCNC: 222 MG/DL (ref 74–99)
GLUCOSE UR STRIP.AUTO-MCNC: NORMAL MG/DL
HCT VFR BLD AUTO: 39.5 % (ref 41–52)
HGB BLD-MCNC: 12.6 G/DL (ref 13.5–17.5)
IMM GRANULOCYTES # BLD AUTO: 0.01 X10*3/UL (ref 0–0.5)
IMM GRANULOCYTES NFR BLD AUTO: 0.1 % (ref 0–0.9)
KETONES UR STRIP.AUTO-MCNC: ABNORMAL MG/DL
LEUKOCYTE ESTERASE UR QL STRIP.AUTO: ABNORMAL
LYMPHOCYTES # BLD AUTO: 1.37 X10*3/UL (ref 0.8–3)
LYMPHOCYTES NFR BLD AUTO: 18.6 %
MCH RBC QN AUTO: 28.1 PG (ref 26–34)
MCHC RBC AUTO-ENTMCNC: 31.9 G/DL (ref 32–36)
MCV RBC AUTO: 88 FL (ref 80–100)
MONOCYTES # BLD AUTO: 0.48 X10*3/UL (ref 0.05–0.8)
MONOCYTES NFR BLD AUTO: 6.5 %
MUCOUS THREADS #/AREA URNS AUTO: NORMAL /LPF
NEUTROPHILS # BLD AUTO: 5.23 X10*3/UL (ref 1.6–5.5)
NEUTROPHILS NFR BLD AUTO: 71.1 %
NITRITE UR QL STRIP.AUTO: NEGATIVE
NRBC BLD-RTO: 0 /100 WBCS (ref 0–0)
PH UR STRIP.AUTO: 5 [PH]
PLATELET # BLD AUTO: 247 X10*3/UL (ref 150–450)
POTASSIUM SERPL-SCNC: 4.4 MMOL/L (ref 3.5–5.3)
PROT SERPL-MCNC: 6.2 G/DL (ref 6.4–8.2)
PROT UR STRIP.AUTO-MCNC: ABNORMAL MG/DL
RBC # BLD AUTO: 4.49 X10*6/UL (ref 4.5–5.9)
RBC # UR STRIP.AUTO: NEGATIVE /UL
RBC #/AREA URNS AUTO: NORMAL /HPF
SODIUM SERPL-SCNC: 140 MMOL/L (ref 136–145)
SP GR UR STRIP.AUTO: 1.02
UROBILINOGEN UR STRIP.AUTO-MCNC: NORMAL MG/DL
WBC # BLD AUTO: 7.4 X10*3/UL (ref 4.4–11.3)
WBC #/AREA URNS AUTO: NORMAL /HPF

## 2024-10-18 PROCEDURE — 80053 COMPREHEN METABOLIC PANEL: CPT

## 2024-10-18 PROCEDURE — 85025 COMPLETE CBC W/AUTO DIFF WBC: CPT

## 2024-10-18 PROCEDURE — 83036 HEMOGLOBIN GLYCOSYLATED A1C: CPT

## 2024-10-18 PROCEDURE — 81001 URINALYSIS AUTO W/SCOPE: CPT

## 2024-10-18 PROCEDURE — 82306 VITAMIN D 25 HYDROXY: CPT

## 2024-10-18 PROCEDURE — 36415 COLL VENOUS BLD VENIPUNCTURE: CPT | Performed by: NURSE PRACTITIONER

## 2024-10-18 PROCEDURE — 36415 COLL VENOUS BLD VENIPUNCTURE: CPT

## 2024-10-18 PROCEDURE — 99349 HOME/RES VST EST MOD MDM 40: CPT | Performed by: NURSE PRACTITIONER

## 2024-10-18 PROCEDURE — 87086 URINE CULTURE/COLONY COUNT: CPT

## 2024-10-18 PROCEDURE — 82607 VITAMIN B-12: CPT

## 2024-10-18 ASSESSMENT — PAIN SCALES - GENERAL: PAINLEVEL_OUTOF10: 2

## 2024-10-19 LAB
25(OH)D3 SERPL-MCNC: 81 NG/ML (ref 30–100)
EST. AVERAGE GLUCOSE BLD GHB EST-MCNC: 171 MG/DL
HBA1C MFR BLD: 7.6 %
HOLD SPECIMEN: NORMAL
VIT B12 SERPL-MCNC: 1032 PG/ML (ref 211–911)

## 2024-10-20 LAB — BACTERIA UR CULT: NORMAL

## 2025-04-15 ENCOUNTER — NURSING HOME VISIT (OUTPATIENT)
Dept: POST ACUTE CARE | Facility: EXTERNAL LOCATION | Age: 80
End: 2025-04-15
Payer: MEDICARE

## 2025-04-15 VITALS
RESPIRATION RATE: 16 BRPM | HEIGHT: 63 IN | BODY MASS INDEX: 23.04 KG/M2 | OXYGEN SATURATION: 94 % | HEART RATE: 64 BPM | TEMPERATURE: 98.2 F | DIASTOLIC BLOOD PRESSURE: 68 MMHG | SYSTOLIC BLOOD PRESSURE: 128 MMHG | WEIGHT: 130 LBS

## 2025-04-15 DIAGNOSIS — I10 HTN (HYPERTENSION), BENIGN: ICD-10-CM

## 2025-04-15 DIAGNOSIS — E78.1 HIGH TRIGLYCERIDES: ICD-10-CM

## 2025-04-15 DIAGNOSIS — E11.65 TYPE 2 DIABETES MELLITUS WITH HYPERGLYCEMIA, WITHOUT LONG-TERM CURRENT USE OF INSULIN: Primary | ICD-10-CM

## 2025-04-15 DIAGNOSIS — N40.0 BENIGN PROSTATIC HYPERPLASIA WITHOUT URINARY OBSTRUCTION: ICD-10-CM

## 2025-04-15 DIAGNOSIS — F41.1 GAD (GENERALIZED ANXIETY DISORDER): ICD-10-CM

## 2025-04-15 DIAGNOSIS — E55.9 VITAMIN D DEFICIENCY: ICD-10-CM

## 2025-04-15 DIAGNOSIS — F33.1 MAJOR DEPRESSIVE DISORDER, RECURRENT, MODERATE: ICD-10-CM

## 2025-04-15 DIAGNOSIS — K59.01 SLOW TRANSIT CONSTIPATION: ICD-10-CM

## 2025-04-15 DIAGNOSIS — D64.9 ANEMIA, UNSPECIFIED TYPE: ICD-10-CM

## 2025-04-15 DIAGNOSIS — I20.9 ANGINA PECTORIS: ICD-10-CM

## 2025-04-15 DIAGNOSIS — K21.9 GERD WITHOUT ESOPHAGITIS: ICD-10-CM

## 2025-04-15 PROCEDURE — 36415 COLL VENOUS BLD VENIPUNCTURE: CPT | Performed by: NURSE PRACTITIONER

## 2025-04-15 PROCEDURE — 99349 HOME/RES VST EST MOD MDM 40: CPT | Performed by: NURSE PRACTITIONER

## 2025-04-15 ASSESSMENT — ENCOUNTER SYMPTOMS
TROUBLE SWALLOWING: 0
BRUISES/BLEEDS EASILY: 0
DIARRHEA: 0
FEVER: 0
SLEEP DISTURBANCE: 1
CHEST TIGHTNESS: 0
VOMITING: 0
WOUND: 0
UNEXPECTED WEIGHT CHANGE: 0
PALPITATIONS: 0
NAUSEA: 0
DYSPHORIC MOOD: 1
CHILLS: 0
ABDOMINAL PAIN: 0
COUGH: 0
ARTHRALGIAS: 1
APPETITE CHANGE: 0
SORE THROAT: 0
SPEECH DIFFICULTY: 0
WHEEZING: 0
NERVOUS/ANXIOUS: 1
LIGHT-HEADEDNESS: 0
CONSTIPATION: 0
DIZZINESS: 0
FATIGUE: 1
SHORTNESS OF BREATH: 0

## 2025-04-15 ASSESSMENT — PAIN SCALES - GENERAL: PAINLEVEL_OUTOF10: 0-NO PAIN

## 2025-04-15 NOTE — PROGRESS NOTES
"Subjective   Patient ID: Phuc Varela is a 79 y.o. male who presents for Follow-up (Routine follow up, chronic medical conditions, labs ).    Visit for 80 y/o male seen today at [facility] for routine follow up of chronic medical conditions. PMHx of HTN, angina, cardiac arrhythmia, CVA, DM, GERD, Arthritis, Constipation, Gout, Vitamin D Deficiency, Anxiety, Depression, Insomnia. Pt is due for routine labs today.     He is lying in bed this morning. Reports feeling tired, \"catching up on rest, bored, nothing to do\". Pt awakes easily upon verbal command. He is alert, oriented, able to answer all questions regarding his health. Pt is able to do his own dressing and toileting. Pt reports showering once weekly. Facility staff does help patient shave and he requires assistance with his medications and meal preparation. Pt endorses fatigue. He denies appetite changes, signs of weight loss. Denies abdominal pain, nausea, vomiting. Pt does have a history of constipation but denies any current issues. He has been declining to take his Miralax. He has history of BPH. Does wake up 1-2x at night to urinate, but otherwise denies any urinary concerns. Pt has DM. He checks his BG levels daily. Reports that most values range from . Pt denies any recent hypoglycemic episodes. He denies dizziness, lightheadedness. Pt has HTN, history of angina. He does not follow with cardiology. Denies headaches, blurry vision, chest pain, palpitations, shortness of breath. Pt has anxiety, depression. He follows with [facility]. Last seen on 4/10.  No medication changes noted. Pt does continue to have vivid dreams but has discussed this with his mental health NP. He remains ambulatory without assistive device. He denies recent fall or injury. Denies any acute concerns today.         Current Outpatient Medications:     acetaminophen (Tylenol) 325 mg tablet, Take 2 tablets (650 mg) by mouth every 6 hours if needed for mild pain (1 - " 3) or fever (temp greater than 38.0 C)., Disp: , Rfl:     allopurinol (Zyloprim) 300 mg tablet, Take 1 tablet (300 mg) by mouth once daily., Disp: , Rfl:     amLODIPine (Norvasc) 5 mg tablet, Take 1 tablet (5 mg) by mouth once daily., Disp: , Rfl:     atorvastatin (Lipitor) 10 mg tablet, Take 1 tablet (10 mg) by mouth once daily., Disp: , Rfl:     blood sugar diagnostic strip, 1 strip by in vitro route once daily., Disp: , Rfl:     cholecalciferol (Vitamin D-3) 50 MCG (2000 UT) tablet, Take 1 tablet (2,000 Units) by mouth once daily., Disp: , Rfl:     clopidogrel (Plavix) 75 mg tablet, Take 1 tablet (75 mg) by mouth once daily., Disp: , Rfl:     cyanocobalamin (Vitamin B-12) 1,000 mcg tablet, Take 1 tablet (1,000 mcg) by mouth once daily., Disp: , Rfl:     desvenlafaxine 50 mg 24 hr tablet, Take 1 tablet (50 mg) by mouth once daily., Disp: , Rfl:     docusate sodium (Colace) 100 mg capsule, Take 1 capsule (100 mg) by mouth once daily., Disp: , Rfl:     glipiZIDE (Glucotrol) 5 mg tablet, Take 1.5 tablets (7.5 mg) by mouth 2 times a day., Disp: , Rfl:     hydrOXYzine pamoate (Vistaril) 25 mg capsule, Take 1 capsule (25 mg) by mouth 3 times a day as needed for anxiety., Disp: , Rfl:     lancets (Lancets,Ultra Thin) misc, Inject 1 Lancet under the skin once daily., Disp: 100 each, Rfl: 3    lancets misc, Inject 1 Lancet under the skin once daily. Pt to monitor glucose level once daily., Disp: 100 each, Rfl: 3    losartan (Cozaar) 100 mg tablet, Take 1 tablet (100 mg) by mouth once daily., Disp: , Rfl:     magnesium oxide (Mag-Ox) 400 mg (241.3 mg magnesium) tablet, Take 1 tablet (400 mg) by mouth once daily., Disp: , Rfl:     metFORMIN (Glucophage) 1,000 mg tablet, Take 1 tablet (1,000 mg) by mouth 2 times a day with meals., Disp: , Rfl:     metoprolol tartrate (Lopressor) 25 mg tablet, Take 1 tablet (25 mg) by mouth 2 times a day., Disp: , Rfl:     mirtazapine (Remeron) 7.5 mg tablet, Take 1 tablet (7.5 mg) by mouth  "once daily at bedtime., Disp: , Rfl:     pantoprazole (ProtoNix) 40 mg EC tablet, Take 1 tablet (40 mg) by mouth once daily in the morning. Take before meals., Disp: , Rfl:     potassium chloride CR 20 mEq ER tablet, Take 1 tablet (20 mEq) by mouth once daily., Disp: , Rfl:     sennosides (Senokot) 8.6 mg tablet, Take 1 tablet (8.6 mg) by mouth as needed at bedtime for constipation., Disp: , Rfl:     tamsulosin (Flomax) 0.4 mg 24 hr capsule, Take 1 capsule (0.4 mg) by mouth once daily., Disp: , Rfl:      Review of Systems   Constitutional:  Positive for fatigue. Negative for appetite change, chills, fever and unexpected weight change.   HENT:  Negative for congestion, dental problem, hearing loss, sore throat and trouble swallowing.    Eyes:  Negative for visual disturbance.        Wears glasses   Respiratory:  Negative for cough, chest tightness, shortness of breath and wheezing.    Cardiovascular:  Negative for chest pain, palpitations and leg swelling.   Gastrointestinal:  Negative for abdominal pain, constipation, diarrhea, nausea and vomiting.   Endocrine:        Positive for diabetes, monitors BG levels daily   Genitourinary:  Positive for urgency.        Positive for nocturia, history of BPH   Musculoskeletal:  Positive for arthralgias (bilateral hands/fingers). Negative for gait problem.   Skin:  Negative for rash and wound.   Neurological:  Negative for dizziness, speech difficulty and light-headedness.   Hematological:  Does not bruise/bleed easily.   Psychiatric/Behavioral:  Positive for dysphoric mood and sleep disturbance. The patient is nervous/anxious.      Objective   /68 (BP Location: Left arm, Patient Position: Sitting, BP Cuff Size: Adult)   Pulse 64   Temp 36.8 °C (98.2 °F) (Temporal)   Resp 16   Ht 1.6 m (5' 3\")   Wt 59 kg (130 lb)   SpO2 94%   BMI 23.03 kg/m²     Physical Exam  Constitutional:       General: Lying in bed. He is not in acute distress.     Appearance: Alert. Normal " appearance. Appears tired.   HENT:      Head: Normocephalic and atraumatic.      Nose: Nose normal.      Mouth: Mucous membranes are moist.      Pharynx: Oropharynx is clear.   Eyes:      Extraocular Movements: Extraocular movements intact.      Pupils: Pupils are equal, round, and reactive to light.   Cardiovascular:      Rate and Rhythm: Normal rate and regular rhythm      Pulses: Normal pulses.      No edema.   Pulmonary:      Effort: Pulmonary effort is normal. No respiratory distress.   Abdominal:      General: Bowel sounds are normal.      Palpations: Abdomen is soft.      Tenderness: There is no abdominal tenderness. No CVA tenderness.   Musculoskeletal:         General: Normal range of motion.      Cervical back: Neck supple.      No spinal tenderness.   Skin:     General: Skin is warm and dry. No open wounds.   Neurological:      General: No focal deficit present.      Mental Status: He is alert and oriented to person, place, and time.   Psychiatric:      Comments: Cooperative. Poor eye contact    Assessment/Plan   Diagnoses and all orders for this visit:  Type 2 diabetes mellitus with hyperglycemia, without long-term current use of insulin  -     Hemoglobin A1c; Future  -chronic, managed with Glipizide, Metformin  -continue to monitor BG levels daily   -notify provider with any hypo.hyperglycemic concerns     Vitamin D deficiency  -chronic, managed with cholecalciferol     HTN (hypertension), benign  -     CBC and Auto Differential; Future  -     Comprehensive metabolic panel; Future  -chronic, vitals stable  -continue Losartan, Metoprolol, Amlodipine     High triglycerides  -     Lipid panel; Future  -continue atorvastatin     Angina pectoris  -stable, denies angina  -continue clopidogrel     Anemia, unspecified type  -     Iron and TIBC; Future    GERD without esophagitis  -chronic, stable, no current GI concerns   -continue Pantoprazole, magnesium oxide     Slow transit constipation  -chronic, history  of, no concerns today  -will discontinue Miralax   -continue Senna per order     Benign prostatic hyperplasia without urinary obstruction  -chronic, stable, continue tamsulosin     BILLY (generalized anxiety disorder)  Major depressive disorder, recurrent, moderate  -chronic, mood stable, follows with [facility]  -continue Desvenlafaxine, Mirtazapine, Hydroxyzine     Labs obtained at facility- CBC, CMP, A1c, Lipid panel, Iron studies- pt tolerated well        HEMANT Dumont-CNP

## 2025-04-15 NOTE — LETTER
"Patient: Phuc Varela  : 1945    Encounter Date: 04/15/2025    Subjective  Patient ID: Phuc Varela is a 79 y.o. male who presents for Follow-up (Routine follow up, chronic medical conditions, labs ).    Visit for 78 y/o male seen today at [facility] for routine follow up of chronic medical conditions. PMHx of HTN, angina, cardiac arrhythmia, CVA, DM, GERD, Arthritis, Constipation, Gout, Vitamin D Deficiency, Anxiety, Depression, Insomnia. Pt is due for routine labs today.     He is lying in bed this morning. Reports feeling tired, \"catching up on rest, bored, nothing to do\". Pt awakes easily upon verbal command. He is alert, oriented, able to answer all questions regarding his health. Pt is able to do his own dressing and toileting. Pt reports showering once weekly. Facility staff does help patient shave and he requires assistance with his medications and meal preparation. Pt endorses fatigue. He denies appetite changes, signs of weight loss. Denies abdominal pain, nausea, vomiting. Pt does have a history of constipation but denies any current issues. He has been declining to take his Miralax. He has history of BPH. Does wake up 1-2x at night to urinate, but otherwise denies any urinary concerns. Pt has DM. He checks his BG levels daily. Reports that most values range from . Pt denies any recent hypoglycemic episodes. He denies dizziness, lightheadedness. Pt has HTN, history of angina. He does not follow with cardiology. Denies headaches, blurry vision, chest pain, palpitations, shortness of breath. Pt has anxiety, depression. He follows with [facility]. Last seen on 4/10.  No medication changes noted. Pt does continue to have vivid dreams but has discussed this with his mental health NP. He remains ambulatory without assistive device. He denies recent fall or injury. Denies any acute concerns today.         Current Outpatient Medications:   •  acetaminophen (Tylenol) 325 mg tablet, Take 2 " tablets (650 mg) by mouth every 6 hours if needed for mild pain (1 - 3) or fever (temp greater than 38.0 C)., Disp: , Rfl:   •  allopurinol (Zyloprim) 300 mg tablet, Take 1 tablet (300 mg) by mouth once daily., Disp: , Rfl:   •  amLODIPine (Norvasc) 5 mg tablet, Take 1 tablet (5 mg) by mouth once daily., Disp: , Rfl:   •  atorvastatin (Lipitor) 10 mg tablet, Take 1 tablet (10 mg) by mouth once daily., Disp: , Rfl:   •  blood sugar diagnostic strip, 1 strip by in vitro route once daily., Disp: , Rfl:   •  cholecalciferol (Vitamin D-3) 50 MCG (2000 UT) tablet, Take 1 tablet (2,000 Units) by mouth once daily., Disp: , Rfl:   •  clopidogrel (Plavix) 75 mg tablet, Take 1 tablet (75 mg) by mouth once daily., Disp: , Rfl:   •  cyanocobalamin (Vitamin B-12) 1,000 mcg tablet, Take 1 tablet (1,000 mcg) by mouth once daily., Disp: , Rfl:   •  desvenlafaxine 50 mg 24 hr tablet, Take 1 tablet (50 mg) by mouth once daily., Disp: , Rfl:   •  docusate sodium (Colace) 100 mg capsule, Take 1 capsule (100 mg) by mouth once daily., Disp: , Rfl:   •  glipiZIDE (Glucotrol) 5 mg tablet, Take 1.5 tablets (7.5 mg) by mouth 2 times a day., Disp: , Rfl:   •  hydrOXYzine pamoate (Vistaril) 25 mg capsule, Take 1 capsule (25 mg) by mouth 3 times a day as needed for anxiety., Disp: , Rfl:   •  lancets (Lancets,Ultra Thin) misc, Inject 1 Lancet under the skin once daily., Disp: 100 each, Rfl: 3  •  lancets misc, Inject 1 Lancet under the skin once daily. Pt to monitor glucose level once daily., Disp: 100 each, Rfl: 3  •  losartan (Cozaar) 100 mg tablet, Take 1 tablet (100 mg) by mouth once daily., Disp: , Rfl:   •  magnesium oxide (Mag-Ox) 400 mg (241.3 mg magnesium) tablet, Take 1 tablet (400 mg) by mouth once daily., Disp: , Rfl:   •  metFORMIN (Glucophage) 1,000 mg tablet, Take 1 tablet (1,000 mg) by mouth 2 times a day with meals., Disp: , Rfl:   •  metoprolol tartrate (Lopressor) 25 mg tablet, Take 1 tablet (25 mg) by mouth 2 times a day.,  "Disp: , Rfl:   •  mirtazapine (Remeron) 7.5 mg tablet, Take 1 tablet (7.5 mg) by mouth once daily at bedtime., Disp: , Rfl:   •  pantoprazole (ProtoNix) 40 mg EC tablet, Take 1 tablet (40 mg) by mouth once daily in the morning. Take before meals., Disp: , Rfl:   •  potassium chloride CR 20 mEq ER tablet, Take 1 tablet (20 mEq) by mouth once daily., Disp: , Rfl:   •  sennosides (Senokot) 8.6 mg tablet, Take 1 tablet (8.6 mg) by mouth as needed at bedtime for constipation., Disp: , Rfl:   •  tamsulosin (Flomax) 0.4 mg 24 hr capsule, Take 1 capsule (0.4 mg) by mouth once daily., Disp: , Rfl:      Review of Systems   Constitutional:  Positive for fatigue. Negative for appetite change, chills, fever and unexpected weight change.   HENT:  Negative for congestion, dental problem, hearing loss, sore throat and trouble swallowing.    Eyes:  Negative for visual disturbance.        Wears glasses   Respiratory:  Negative for cough, chest tightness, shortness of breath and wheezing.    Cardiovascular:  Negative for chest pain, palpitations and leg swelling.   Gastrointestinal:  Negative for abdominal pain, constipation, diarrhea, nausea and vomiting.   Endocrine:        Positive for diabetes, monitors BG levels daily   Genitourinary:  Positive for urgency.        Positive for nocturia, history of BPH   Musculoskeletal:  Positive for arthralgias (bilateral hands/fingers). Negative for gait problem.   Skin:  Negative for rash and wound.   Neurological:  Negative for dizziness, speech difficulty and light-headedness.   Hematological:  Does not bruise/bleed easily.   Psychiatric/Behavioral:  Positive for dysphoric mood and sleep disturbance. The patient is nervous/anxious.      Objective  /68 (BP Location: Left arm, Patient Position: Sitting, BP Cuff Size: Adult)   Pulse 64   Temp 36.8 °C (98.2 °F) (Temporal)   Resp 16   Ht 1.6 m (5' 3\")   Wt 59 kg (130 lb)   SpO2 94%   BMI 23.03 kg/m²     Physical Exam  Constitutional: "       General: Lying in bed. He is not in acute distress.     Appearance: Alert. Normal appearance. Appears tired.   HENT:      Head: Normocephalic and atraumatic.      Nose: Nose normal.      Mouth: Mucous membranes are moist.      Pharynx: Oropharynx is clear.   Eyes:      Extraocular Movements: Extraocular movements intact.      Pupils: Pupils are equal, round, and reactive to light.   Cardiovascular:      Rate and Rhythm: Normal rate and regular rhythm      Pulses: Normal pulses.      No edema.   Pulmonary:      Effort: Pulmonary effort is normal. No respiratory distress.   Abdominal:      General: Bowel sounds are normal.      Palpations: Abdomen is soft.      Tenderness: There is no abdominal tenderness. No CVA tenderness.   Musculoskeletal:         General: Normal range of motion.      Cervical back: Neck supple.      No spinal tenderness.   Skin:     General: Skin is warm and dry. No open wounds.   Neurological:      General: No focal deficit present.      Mental Status: He is alert and oriented to person, place, and time.   Psychiatric:      Comments: Cooperative. Poor eye contact    Assessment/Plan  Diagnoses and all orders for this visit:  Type 2 diabetes mellitus with hyperglycemia, without long-term current use of insulin  -     Hemoglobin A1c; Future  -chronic, managed with Glipizide, Metformin  -continue to monitor BG levels daily   -notify provider with any hypo.hyperglycemic concerns     Vitamin D deficiency  -chronic, managed with cholecalciferol     HTN (hypertension), benign  -     CBC and Auto Differential; Future  -     Comprehensive metabolic panel; Future  -chronic, vitals stable  -continue Losartan, Metoprolol, Amlodipine     High triglycerides  -     Lipid panel; Future  -continue atorvastatin     Angina pectoris  -stable, denies angina  -continue clopidogrel     Anemia, unspecified type  -     Iron and TIBC; Future    GERD without esophagitis  -chronic, stable, no current GI concerns    -continue Pantoprazole, magnesium oxide     Slow transit constipation  -chronic, history of, no concerns today  -will discontinue Miralax   -continue Senna per order     Benign prostatic hyperplasia without urinary obstruction  -chronic, stable, continue tamsulosin     BILLY (generalized anxiety disorder)  Major depressive disorder, recurrent, moderate  -chronic, mood stable, follows with [facility]  -continue Desvenlafaxine, Mirtazapine, Hydroxyzine     Labs obtained at facility- CBC, CMP, A1c, Lipid panel, Iron studies- pt tolerated well        JACINTO Dumont     Electronically Signed By: JACINTO Dumont   4/15/25 11:46 AM

## 2025-04-16 LAB
ALBUMIN SERPL-MCNC: 3.7 G/DL (ref 3.6–5.1)
ALP SERPL-CCNC: 95 U/L (ref 35–144)
ALT SERPL-CCNC: 12 U/L (ref 9–46)
ANION GAP SERPL CALCULATED.4IONS-SCNC: 10 MMOL/L (CALC) (ref 7–17)
AST SERPL-CCNC: 10 U/L (ref 10–35)
BASOPHILS # BLD AUTO: 59 CELLS/UL (ref 0–200)
BASOPHILS NFR BLD AUTO: 0.9 %
BILIRUB SERPL-MCNC: 0.4 MG/DL (ref 0.2–1.2)
BUN SERPL-MCNC: 14 MG/DL (ref 7–25)
CALCIUM SERPL-MCNC: 9 MG/DL (ref 8.6–10.3)
CHLORIDE SERPL-SCNC: 105 MMOL/L (ref 98–110)
CHOLEST SERPL-MCNC: 91 MG/DL
CHOLEST/HDLC SERPL: 2.7 (CALC)
CO2 SERPL-SCNC: 25 MMOL/L (ref 20–32)
CREAT SERPL-MCNC: 1.09 MG/DL (ref 0.7–1.22)
EGFRCR SERPLBLD CKD-EPI 2021: 69 ML/MIN/1.73M2
EOSINOPHIL # BLD AUTO: 260 CELLS/UL (ref 15–500)
EOSINOPHIL NFR BLD AUTO: 4 %
ERYTHROCYTE [DISTWIDTH] IN BLOOD BY AUTOMATED COUNT: 13.6 % (ref 11–15)
EST. AVERAGE GLUCOSE BLD GHB EST-MCNC: 183 MG/DL
EST. AVERAGE GLUCOSE BLD GHB EST-SCNC: 10.1 MMOL/L
GLUCOSE SERPL-MCNC: 298 MG/DL (ref 65–139)
HBA1C MFR BLD: 8 %
HCT VFR BLD AUTO: 36.7 % (ref 38.5–50)
HDLC SERPL-MCNC: 34 MG/DL
HGB BLD-MCNC: 11.8 G/DL (ref 13.2–17.1)
IRON SATN MFR SERPL: 27 % (CALC) (ref 20–48)
IRON SERPL-MCNC: 84 MCG/DL (ref 50–180)
LDLC SERPL CALC-MCNC: 31 MG/DL (CALC)
LYMPHOCYTES # BLD AUTO: 1807 CELLS/UL (ref 850–3900)
LYMPHOCYTES NFR BLD AUTO: 27.8 %
MCH RBC QN AUTO: 28.4 PG (ref 27–33)
MCHC RBC AUTO-ENTMCNC: 32.2 G/DL (ref 32–36)
MCV RBC AUTO: 88.2 FL (ref 80–100)
MONOCYTES # BLD AUTO: 468 CELLS/UL (ref 200–950)
MONOCYTES NFR BLD AUTO: 7.2 %
NEUTROPHILS # BLD AUTO: 3907 CELLS/UL (ref 1500–7800)
NEUTROPHILS NFR BLD AUTO: 60.1 %
NONHDLC SERPL-MCNC: 57 MG/DL (CALC)
PLATELET # BLD AUTO: 226 THOUSAND/UL (ref 140–400)
PMV BLD REES-ECKER: 10.2 FL (ref 7.5–12.5)
POTASSIUM SERPL-SCNC: 4.1 MMOL/L (ref 3.5–5.3)
PROT SERPL-MCNC: 5.9 G/DL (ref 6.1–8.1)
RBC # BLD AUTO: 4.16 MILLION/UL (ref 4.2–5.8)
SODIUM SERPL-SCNC: 140 MMOL/L (ref 135–146)
TIBC SERPL-MCNC: 308 MCG/DL (CALC) (ref 250–425)
TRIGL SERPL-MCNC: 183 MG/DL
WBC # BLD AUTO: 6.5 THOUSAND/UL (ref 3.8–10.8)

## 2025-04-17 ENCOUNTER — TELEPHONE (OUTPATIENT)
Dept: PRIMARY CARE | Facility: CLINIC | Age: 80
End: 2025-04-17
Payer: MEDICARE

## 2025-04-17 DIAGNOSIS — E11.65 UNCONTROLLED TYPE 2 DIABETES MELLITUS WITH HYPERGLYCEMIA: Primary | ICD-10-CM

## 2025-04-17 NOTE — TELEPHONE ENCOUNTER
Pt with elevated A1c of 8.0. Will start Jardiance. CMP with elevated glucose 298, labs non fasting, otherwise stable. CBC with chronic anemia, iron studies normal. Lipid panel with normal cholesterol/LDL, triglycerides elevated but improving.

## 2025-06-10 ENCOUNTER — TELEPHONE (OUTPATIENT)
Dept: PRIMARY CARE | Facility: CLINIC | Age: 80
End: 2025-06-10
Payer: MEDICARE

## 2025-06-10 NOTE — TELEPHONE ENCOUNTER
Pt started complaining of abdominal pain yesterday per Sushila and advised Sushila he was up all night. Pain currently 3/10 and over night states it was an 8/10. Pt feels he has kidney stones as he has had them in the past.

## 2025-06-13 ENCOUNTER — NURSING HOME VISIT (OUTPATIENT)
Dept: POST ACUTE CARE | Facility: EXTERNAL LOCATION | Age: 80
End: 2025-06-13
Payer: MEDICARE

## 2025-06-13 VITALS
TEMPERATURE: 97.5 F | WEIGHT: 135 LBS | OXYGEN SATURATION: 97 % | RESPIRATION RATE: 18 BRPM | BODY MASS INDEX: 23.92 KG/M2 | HEIGHT: 63 IN | SYSTOLIC BLOOD PRESSURE: 124 MMHG | DIASTOLIC BLOOD PRESSURE: 64 MMHG | HEART RATE: 67 BPM

## 2025-06-13 DIAGNOSIS — E11.65 TYPE 2 DIABETES MELLITUS WITH HYPERGLYCEMIA, WITHOUT LONG-TERM CURRENT USE OF INSULIN: Primary | ICD-10-CM

## 2025-06-13 DIAGNOSIS — F41.1 GENERALIZED ANXIETY DISORDER: ICD-10-CM

## 2025-06-13 DIAGNOSIS — R19.7 DIARRHEA, UNSPECIFIED TYPE: ICD-10-CM

## 2025-06-13 DIAGNOSIS — F33.1 MAJOR DEPRESSIVE DISORDER, RECURRENT, MODERATE: ICD-10-CM

## 2025-06-13 DIAGNOSIS — R10.84 GENERALIZED ABDOMINAL PAIN: ICD-10-CM

## 2025-06-13 PROCEDURE — 99349 HOME/RES VST EST MOD MDM 40: CPT | Performed by: NURSE PRACTITIONER

## 2025-06-13 ASSESSMENT — PAIN SCALES - GENERAL: PAINLEVEL_OUTOF10: 0-NO PAIN

## 2025-06-13 NOTE — PROGRESS NOTES
"Subjective   Patient ID: Phuc Varela is a 79 y.o. male who presents for Follow-up (DM follow up, reports of abdominal pain this week).    Visit for 78 y/o male seen today at [facility] for follow of up Diabetes, reports of abdominal pain this week. Facility staff contacted house calls office on 6/10 reporting that patient started complaining of abdominal pain the day before. He told facility staff that he was up all night in pain and felt he was passing a kidney stone. Pt is standing in his room this afternoon. He is alert, oriented, answering all questions without issue. Pt tells me that his abdominal pain has resolved but states \"my appetite sucks, I just don't like the food here, don't want to eat\". He denies any associated fever, chills, nausea or vomiting. Pt has DM. His last A1c on 4/15/25 was 8.0. Pt was started on Jardiance 10 mg daily. He started having worsening anxiety, depression and reported occasional GI symptoms, decreased appetite after starting the medication. His mental health NP through [facility] reached out regarding concerns and patients Jardiance was placed on hold starting 5/27/25 to see if his symptoms improved. Pt reports that he took a dose of the Jardiance yesterday and then had \"severe diarrhea\". He reports that his appetite has slowly improved but he does not like most of the meals offered. He has requested a BLT sandwich today, rather than the chicken salad. Pt reports feeling \"better\" when not taking the Jardiance. He did have recent follow up with his mental health NP Neeta White and she did note that he seems to be improving. Pt continues to monitor his glucose levels daily. He reports FBS to range from . Pt denies any recent hypoglycemic episodes. He denies dizziness, lightheadedness or any other concerns.          Current Outpatient Medications:     acetaminophen (Tylenol) 325 mg tablet, Take 2 tablets (650 mg) by mouth every 6 hours if needed for mild pain (1 " - 3) or fever (temp greater than 38.0 C)., Disp: , Rfl:     allopurinol (Zyloprim) 300 mg tablet, Take 1 tablet (300 mg) by mouth once daily., Disp: , Rfl:     amLODIPine (Norvasc) 5 mg tablet, Take 1 tablet (5 mg) by mouth once daily., Disp: , Rfl:     atorvastatin (Lipitor) 10 mg tablet, Take 1 tablet (10 mg) by mouth once daily., Disp: , Rfl:     blood sugar diagnostic strip, 1 strip by in vitro route once daily., Disp: , Rfl:     cholecalciferol (Vitamin D-3) 50 MCG (2000 UT) tablet, Take 1 tablet (2,000 Units) by mouth once daily., Disp: , Rfl:     clopidogrel (Plavix) 75 mg tablet, Take 1 tablet (75 mg) by mouth once daily., Disp: , Rfl:     cyanocobalamin (Vitamin B-12) 1,000 mcg tablet, Take 1 tablet (1,000 mcg) by mouth once daily., Disp: , Rfl:     desvenlafaxine 50 mg 24 hr tablet, Take 1 tablet (50 mg) by mouth once daily., Disp: , Rfl:     docusate sodium (Colace) 100 mg capsule, Take 1 capsule (100 mg) by mouth once daily., Disp: , Rfl:     empagliflozin (Jardiance) 10 mg tablet, Take 1 tablet (10 mg) by mouth once daily. (Patient not taking: Reported on 6/13/2025), Disp: 30 tablet, Rfl: 11    glipiZIDE (Glucotrol) 5 mg tablet, Take 1.5 tablets (7.5 mg) by mouth 2 times a day., Disp: , Rfl:     hydrOXYzine pamoate (Vistaril) 25 mg capsule, Take 1 capsule (25 mg) by mouth 3 times a day as needed for anxiety., Disp: , Rfl:     lancets (Lancets,Ultra Thin) misc, Inject 1 Lancet under the skin once daily., Disp: 100 each, Rfl: 3    lancets misc, Inject 1 Lancet under the skin once daily. Pt to monitor glucose level once daily., Disp: 100 each, Rfl: 3    losartan (Cozaar) 100 mg tablet, Take 1 tablet (100 mg) by mouth once daily., Disp: , Rfl:     magnesium oxide (Mag-Ox) 400 mg (241.3 mg magnesium) tablet, Take 1 tablet (400 mg) by mouth once daily., Disp: , Rfl:     metFORMIN (Glucophage) 1,000 mg tablet, Take 1 tablet (1,000 mg) by mouth 2 times a day with meals., Disp: , Rfl:     metoprolol tartrate  "(Lopressor) 25 mg tablet, Take 1 tablet (25 mg) by mouth 2 times a day., Disp: , Rfl:     mirtazapine (Remeron) 7.5 mg tablet, Take 1 tablet (7.5 mg) by mouth once daily at bedtime., Disp: , Rfl:     pantoprazole (ProtoNix) 40 mg EC tablet, Take 1 tablet (40 mg) by mouth once daily in the morning. Take before meals., Disp: , Rfl:     potassium chloride CR 20 mEq ER tablet, Take 1 tablet (20 mEq) by mouth once daily., Disp: , Rfl:     sennosides (Senokot) 8.6 mg tablet, Take 1 tablet (8.6 mg) by mouth as needed at bedtime for constipation., Disp: , Rfl:     tamsulosin (Flomax) 0.4 mg 24 hr capsule, Take 1 capsule (0.4 mg) by mouth once daily., Disp: , Rfl:      Review of Systems  Constitutional: Negative for appetite change, chills, fever and unexpected weight change.   HENT: Negative for congestion, dental problem, hearing loss, sore throat and trouble swallowing.    Eyes:  Negative for visual disturbance.   Respiratory: Negative for cough, chest tightness, shortness of breath and wheezing.    Cardiovascular:  Negative for chest pain, palpitations and leg swelling.   Gastrointestinal: Positive for diarrhea. Negative for abdominal pain, constipation, nausea and vomiting.   Endocrine: Positive for diabetes, monitors BG levels daily   Genitourinary:  Positive for urgency. Positive for nocturia, history of BPH   Musculoskeletal:  Positive for arthralgias (bilateral hands/fingers). Negative for gait problem.   Skin:  Negative for rash and wound.   Neurological:  Negative for dizziness, speech difficulty and light-headedness.   Hematological:  Does not bruise/bleed easily.   Psychiatric/Behavioral:  Positive for dysphoric mood and sleep disturbance. The patient is nervous/anxious.       Objective   /64 (BP Location: Right arm, Patient Position: Sitting, BP Cuff Size: Adult)   Pulse 67   Temp 36.4 °C (97.5 °F) (Temporal)   Resp 18   Ht 1.6 m (5' 3\")   Wt 61.2 kg (135 lb)   SpO2 97%   BMI 23.91 kg/m² "     Physical Exam  Constitutional:       General: Lying in bed. He is not in acute distress.     Appearance: Alert. Normal appearance. Appears tired.   HENT:      Head: Normocephalic and atraumatic.      Nose: Nose normal.      Mouth: Mucous membranes are moist.      Pharynx: Oropharynx is clear.   Eyes:      Extraocular Movements: Extraocular movements intact.      Pupils: Pupils are equal, round, and reactive to light.   Cardiovascular:      Rate and Rhythm: Normal rate and regular rhythm      Pulses: Normal pulses.      No edema.   Pulmonary:      Effort: Pulmonary effort is normal. No respiratory distress.   Abdominal:      General: Bowel sounds are normal.      Palpations: Abdomen is soft.      Tenderness: There is no abdominal tenderness. No CVA tenderness.   Musculoskeletal:         General: Normal range of motion.      Cervical back: Neck supple.      No spinal tenderness.   Skin:     General: Skin is warm and dry. No open wounds.   Neurological:      General: No focal deficit present.      Mental Status: He is alert and oriented to person, place, and time.   Psychiatric:      Comments: Cooperative. Poor eye contact    Lab Results   Component Value Date    WBC 6.5 04/15/2025    HGB 11.8 (L) 04/15/2025    HCT 36.7 (L) 04/15/2025    MCV 88.2 04/15/2025     04/15/2025       Chemistry    Lab Results   Component Value Date/Time     04/15/2025 1212    K 4.1 04/15/2025 1212     04/15/2025 1212    CO2 25 04/15/2025 1212    BUN 14 04/15/2025 1212    CREATININE 1.09 04/15/2025 1212    Lab Results   Component Value Date/Time    CALCIUM 9.0 04/15/2025 1212    ALKPHOS 95 04/15/2025 1212    AST 10 04/15/2025 1212    ALT 12 04/15/2025 1212    BILITOT 0.4 04/15/2025 1212        Lab Results   Component Value Date    HGBA1C 8.0 (H) 04/15/2025      Assessment/Plan   Diagnoses and all orders for this visit:  Type 2 diabetes mellitus with hyperglycemia, without long-term current use of insulin  -chronic,  poorly controlled, last A1c 8.0   -unable to tolerate Jardiance due to side effects- will discontinue medication   -continue Metformin, Glipizide   -monitor FBS daily, notify provider with any hypo/hyperglycemic concerns     Generalized abdominal pain  -acute pain, resolved   -pt denies any further pain/discomfort, eating without issue, no nausea or vomiting reported     Diarrhea, unspecified type  -acute, started after taking dose of Jardiance  -medication discontinued     Major depressive disorder, recurrent, moderate  Generalized anxiety disorder  -chronic, follows with [facility] in facility, seen by both nurse practitioner and counselor   -continue Desvenlafaxine, Mirtazapine, Hydroxyzine as prescribed        Esmer Turpin, APRN-CNP

## 2025-06-13 NOTE — LETTER
"Patient: Phuc Varela  : 1945    Encounter Date: 2025    Subjective  Patient ID: Phuc Varela is a 79 y.o. male who presents for Follow-up (DM follow up, reports of abdominal pain this week).    Visit for 78 y/o male seen today at [facility] for follow of up Diabetes, reports of abdominal pain this week. Facility staff contacted house calls office on 6/10 reporting that patient started complaining of abdominal pain the day before. He told facility staff that he was up all night in pain and felt he was passing a kidney stone. Pt is standing in his room this afternoon. He is alert, oriented, answering all questions without issue. Pt tells me that his abdominal pain has resolved but states \"my appetite sucks, I just don't like the food here, don't want to eat\". He denies any associated fever, chills, nausea or vomiting. Pt has DM. His last A1c on 4/15/25 was 8.0. Pt was started on Jardiance 10 mg daily. He started having worsening anxiety, depression and reported occasional GI symptoms, decreased appetite after starting the medication. His mental health NP through [facility] reached out regarding concerns and patients Jardiance was placed on hold starting 25 to see if his symptoms improved. Pt reports that he took a dose of the Jardiance yesterday and then had \"severe diarrhea\". He reports that his appetite has slowly improved but he does not like most of the meals offered. He has requested a BLT sandwich today, rather than the chicken salad. Pt reports feeling \"better\" when not taking the Jardiance. He did have recent follow up with his mental health NP Neeta White and she did note that he seems to be improving. Pt continues to monitor his glucose levels daily. He reports FBS to range from . Pt denies any recent hypoglycemic episodes. He denies dizziness, lightheadedness or any other concerns.          Current Outpatient Medications:   •  acetaminophen (Tylenol) 325 mg tablet, Take " 2 tablets (650 mg) by mouth every 6 hours if needed for mild pain (1 - 3) or fever (temp greater than 38.0 C)., Disp: , Rfl:   •  allopurinol (Zyloprim) 300 mg tablet, Take 1 tablet (300 mg) by mouth once daily., Disp: , Rfl:   •  amLODIPine (Norvasc) 5 mg tablet, Take 1 tablet (5 mg) by mouth once daily., Disp: , Rfl:   •  atorvastatin (Lipitor) 10 mg tablet, Take 1 tablet (10 mg) by mouth once daily., Disp: , Rfl:   •  blood sugar diagnostic strip, 1 strip by in vitro route once daily., Disp: , Rfl:   •  cholecalciferol (Vitamin D-3) 50 MCG (2000 UT) tablet, Take 1 tablet (2,000 Units) by mouth once daily., Disp: , Rfl:   •  clopidogrel (Plavix) 75 mg tablet, Take 1 tablet (75 mg) by mouth once daily., Disp: , Rfl:   •  cyanocobalamin (Vitamin B-12) 1,000 mcg tablet, Take 1 tablet (1,000 mcg) by mouth once daily., Disp: , Rfl:   •  desvenlafaxine 50 mg 24 hr tablet, Take 1 tablet (50 mg) by mouth once daily., Disp: , Rfl:   •  docusate sodium (Colace) 100 mg capsule, Take 1 capsule (100 mg) by mouth once daily., Disp: , Rfl:   •  empagliflozin (Jardiance) 10 mg tablet, Take 1 tablet (10 mg) by mouth once daily. (Patient not taking: Reported on 6/13/2025), Disp: 30 tablet, Rfl: 11  •  glipiZIDE (Glucotrol) 5 mg tablet, Take 1.5 tablets (7.5 mg) by mouth 2 times a day., Disp: , Rfl:   •  hydrOXYzine pamoate (Vistaril) 25 mg capsule, Take 1 capsule (25 mg) by mouth 3 times a day as needed for anxiety., Disp: , Rfl:   •  lancets (Lancets,Ultra Thin) misc, Inject 1 Lancet under the skin once daily., Disp: 100 each, Rfl: 3  •  lancets misc, Inject 1 Lancet under the skin once daily. Pt to monitor glucose level once daily., Disp: 100 each, Rfl: 3  •  losartan (Cozaar) 100 mg tablet, Take 1 tablet (100 mg) by mouth once daily., Disp: , Rfl:   •  magnesium oxide (Mag-Ox) 400 mg (241.3 mg magnesium) tablet, Take 1 tablet (400 mg) by mouth once daily., Disp: , Rfl:   •  metFORMIN (Glucophage) 1,000 mg tablet, Take 1 tablet  (1,000 mg) by mouth 2 times a day with meals., Disp: , Rfl:   •  metoprolol tartrate (Lopressor) 25 mg tablet, Take 1 tablet (25 mg) by mouth 2 times a day., Disp: , Rfl:   •  mirtazapine (Remeron) 7.5 mg tablet, Take 1 tablet (7.5 mg) by mouth once daily at bedtime., Disp: , Rfl:   •  pantoprazole (ProtoNix) 40 mg EC tablet, Take 1 tablet (40 mg) by mouth once daily in the morning. Take before meals., Disp: , Rfl:   •  potassium chloride CR 20 mEq ER tablet, Take 1 tablet (20 mEq) by mouth once daily., Disp: , Rfl:   •  sennosides (Senokot) 8.6 mg tablet, Take 1 tablet (8.6 mg) by mouth as needed at bedtime for constipation., Disp: , Rfl:   •  tamsulosin (Flomax) 0.4 mg 24 hr capsule, Take 1 capsule (0.4 mg) by mouth once daily., Disp: , Rfl:      Review of Systems  Constitutional: Negative for appetite change, chills, fever and unexpected weight change.   HENT: Negative for congestion, dental problem, hearing loss, sore throat and trouble swallowing.    Eyes:  Negative for visual disturbance.   Respiratory: Negative for cough, chest tightness, shortness of breath and wheezing.    Cardiovascular:  Negative for chest pain, palpitations and leg swelling.   Gastrointestinal: Positive for diarrhea. Negative for abdominal pain, constipation, nausea and vomiting.   Endocrine: Positive for diabetes, monitors BG levels daily   Genitourinary:  Positive for urgency. Positive for nocturia, history of BPH   Musculoskeletal:  Positive for arthralgias (bilateral hands/fingers). Negative for gait problem.   Skin:  Negative for rash and wound.   Neurological:  Negative for dizziness, speech difficulty and light-headedness.   Hematological:  Does not bruise/bleed easily.   Psychiatric/Behavioral:  Positive for dysphoric mood and sleep disturbance. The patient is nervous/anxious.       Objective  /64 (BP Location: Right arm, Patient Position: Sitting, BP Cuff Size: Adult)   Pulse 67   Temp 36.4 °C (97.5 °F) (Temporal)    "Resp 18   Ht 1.6 m (5' 3\")   Wt 61.2 kg (135 lb)   SpO2 97%   BMI 23.91 kg/m²     Physical Exam  Constitutional:       General: Lying in bed. He is not in acute distress.     Appearance: Alert. Normal appearance. Appears tired.   HENT:      Head: Normocephalic and atraumatic.      Nose: Nose normal.      Mouth: Mucous membranes are moist.      Pharynx: Oropharynx is clear.   Eyes:      Extraocular Movements: Extraocular movements intact.      Pupils: Pupils are equal, round, and reactive to light.   Cardiovascular:      Rate and Rhythm: Normal rate and regular rhythm      Pulses: Normal pulses.      No edema.   Pulmonary:      Effort: Pulmonary effort is normal. No respiratory distress.   Abdominal:      General: Bowel sounds are normal.      Palpations: Abdomen is soft.      Tenderness: There is no abdominal tenderness. No CVA tenderness.   Musculoskeletal:         General: Normal range of motion.      Cervical back: Neck supple.      No spinal tenderness.   Skin:     General: Skin is warm and dry. No open wounds.   Neurological:      General: No focal deficit present.      Mental Status: He is alert and oriented to person, place, and time.   Psychiatric:      Comments: Cooperative. Poor eye contact    Lab Results   Component Value Date    WBC 6.5 04/15/2025    HGB 11.8 (L) 04/15/2025    HCT 36.7 (L) 04/15/2025    MCV 88.2 04/15/2025     04/15/2025       Chemistry    Lab Results   Component Value Date/Time     04/15/2025 1212    K 4.1 04/15/2025 1212     04/15/2025 1212    CO2 25 04/15/2025 1212    BUN 14 04/15/2025 1212    CREATININE 1.09 04/15/2025 1212    Lab Results   Component Value Date/Time    CALCIUM 9.0 04/15/2025 1212    ALKPHOS 95 04/15/2025 1212    AST 10 04/15/2025 1212    ALT 12 04/15/2025 1212    BILITOT 0.4 04/15/2025 1212        Lab Results   Component Value Date    HGBA1C 8.0 (H) 04/15/2025      Assessment/Plan  Diagnoses and all orders for this visit:  Type 2 diabetes " mellitus with hyperglycemia, without long-term current use of insulin  -chronic, poorly controlled, last A1c 8.0   -unable to tolerate Jardiance due to side effects- will discontinue medication   -continue Metformin, Glipizide   -monitor FBS daily, notify provider with any hypo/hyperglycemic concerns     Generalized abdominal pain  -acute pain, resolved   -pt denies any further pain/discomfort, eating without issue, no nausea or vomiting reported     Diarrhea, unspecified type  -acute, started after taking dose of Jardiance  -medication discontinued     Major depressive disorder, recurrent, moderate  Generalized anxiety disorder  -chronic, follows with [facility] in facility, seen by both nurse practitioner and counselor   -continue Desvenlafaxine, Mirtazapine, Hydroxyzine as prescribed        JACINTO Dumont     Electronically Signed By: JACINTO Dumont   6/13/25 12:59 PM

## 2025-09-02 ENCOUNTER — TELEPHONE (OUTPATIENT)
Dept: PRIMARY CARE | Facility: CLINIC | Age: 80
End: 2025-09-02
Payer: MEDICARE

## 2025-09-05 ENCOUNTER — NURSING HOME VISIT (OUTPATIENT)
Dept: POST ACUTE CARE | Facility: EXTERNAL LOCATION | Age: 80
End: 2025-09-05
Payer: MEDICARE

## 2025-09-05 VITALS
TEMPERATURE: 97.9 F | HEIGHT: 63 IN | RESPIRATION RATE: 16 BRPM | HEART RATE: 72 BPM | SYSTOLIC BLOOD PRESSURE: 130 MMHG | DIASTOLIC BLOOD PRESSURE: 72 MMHG | WEIGHT: 128 LBS | BODY MASS INDEX: 22.68 KG/M2 | OXYGEN SATURATION: 95 %

## 2025-09-05 DIAGNOSIS — E55.9 VITAMIN D DEFICIENCY: ICD-10-CM

## 2025-09-05 DIAGNOSIS — I10 HTN (HYPERTENSION), BENIGN: ICD-10-CM

## 2025-09-05 DIAGNOSIS — D64.9 ANEMIA, UNSPECIFIED TYPE: ICD-10-CM

## 2025-09-05 DIAGNOSIS — E11.65 TYPE 2 DIABETES MELLITUS WITH HYPERGLYCEMIA, WITHOUT LONG-TERM CURRENT USE OF INSULIN: ICD-10-CM

## 2025-09-05 DIAGNOSIS — S00.521A BLISTER OF LIP: ICD-10-CM

## 2025-09-05 DIAGNOSIS — L55.9 SUNBURN: Primary | ICD-10-CM

## 2025-09-05 ASSESSMENT — PAIN SCALES - GENERAL: PAINLEVEL_OUTOF10: 2

## 2025-09-06 LAB
25(OH)D3+25(OH)D2 SERPL-MCNC: 77 NG/ML (ref 30–100)
ANION GAP SERPL CALCULATED.4IONS-SCNC: 13 MMOL/L (CALC) (ref 7–17)
BASOPHILS # BLD AUTO: 28 CELLS/UL (ref 0–200)
BASOPHILS NFR BLD AUTO: 0.4 %
BUN SERPL-MCNC: 25 MG/DL (ref 7–25)
BUN/CREAT SERPL: ABNORMAL (CALC) (ref 6–22)
CALCIUM SERPL-MCNC: 9.4 MG/DL (ref 8.6–10.3)
CHLORIDE SERPL-SCNC: 108 MMOL/L (ref 98–110)
CO2 SERPL-SCNC: 23 MMOL/L (ref 20–32)
CREAT SERPL-MCNC: 1.05 MG/DL (ref 0.7–1.22)
EGFRCR SERPLBLD CKD-EPI 2021: 72 ML/MIN/1.73M2
EOSINOPHIL # BLD AUTO: 220 CELLS/UL (ref 15–500)
EOSINOPHIL NFR BLD AUTO: 3.1 %
ERYTHROCYTE [DISTWIDTH] IN BLOOD BY AUTOMATED COUNT: 14.1 % (ref 11–15)
EST. AVERAGE GLUCOSE BLD GHB EST-MCNC: 166 MG/DL
EST. AVERAGE GLUCOSE BLD GHB EST-SCNC: 9.2 MMOL/L
GLUCOSE SERPL-MCNC: 281 MG/DL (ref 65–99)
HBA1C MFR BLD: 7.4 %
HCT VFR BLD AUTO: 34.7 % (ref 38.5–50)
HGB BLD-MCNC: 11.2 G/DL (ref 13.2–17.1)
LYMPHOCYTES # BLD AUTO: 1335 CELLS/UL (ref 850–3900)
LYMPHOCYTES NFR BLD AUTO: 18.8 %
MCH RBC QN AUTO: 28.8 PG (ref 27–33)
MCHC RBC AUTO-ENTMCNC: 32.3 G/DL (ref 32–36)
MCV RBC AUTO: 89.2 FL (ref 80–100)
MONOCYTES # BLD AUTO: 525 CELLS/UL (ref 200–950)
MONOCYTES NFR BLD AUTO: 7.4 %
NEUTROPHILS # BLD AUTO: 4991 CELLS/UL (ref 1500–7800)
NEUTROPHILS NFR BLD AUTO: 70.3 %
PLATELET # BLD AUTO: 243 THOUSAND/UL (ref 140–400)
PMV BLD REES-ECKER: 10 FL (ref 7.5–12.5)
POTASSIUM SERPL-SCNC: 4.8 MMOL/L (ref 3.5–5.3)
RBC # BLD AUTO: 3.89 MILLION/UL (ref 4.2–5.8)
SODIUM SERPL-SCNC: 144 MMOL/L (ref 135–146)
WBC # BLD AUTO: 7.1 THOUSAND/UL (ref 3.8–10.8)